# Patient Record
Sex: FEMALE | Race: WHITE | NOT HISPANIC OR LATINO
[De-identification: names, ages, dates, MRNs, and addresses within clinical notes are randomized per-mention and may not be internally consistent; named-entity substitution may affect disease eponyms.]

---

## 2017-06-01 ENCOUNTER — APPOINTMENT (OUTPATIENT)
Dept: MAMMOGRAPHY | Facility: CLINIC | Age: 82
End: 2017-06-01

## 2017-06-01 ENCOUNTER — OUTPATIENT (OUTPATIENT)
Dept: OUTPATIENT SERVICES | Facility: HOSPITAL | Age: 82
LOS: 1 days | End: 2017-06-01
Payer: MEDICARE

## 2017-06-01 DIAGNOSIS — Z00.8 ENCOUNTER FOR OTHER GENERAL EXAMINATION: ICD-10-CM

## 2017-06-01 PROCEDURE — 77063 BREAST TOMOSYNTHESIS BI: CPT

## 2017-06-01 PROCEDURE — 77067 SCR MAMMO BI INCL CAD: CPT

## 2017-06-20 ENCOUNTER — APPOINTMENT (OUTPATIENT)
Dept: ELECTROPHYSIOLOGY | Facility: CLINIC | Age: 82
End: 2017-06-20

## 2017-06-20 VITALS
HEART RATE: 64 BPM | HEIGHT: 59 IN | BODY MASS INDEX: 31.65 KG/M2 | SYSTOLIC BLOOD PRESSURE: 139 MMHG | WEIGHT: 157 LBS | DIASTOLIC BLOOD PRESSURE: 60 MMHG

## 2017-11-22 ENCOUNTER — INPATIENT (INPATIENT)
Facility: HOSPITAL | Age: 82
LOS: 1 days | Discharge: HOME | End: 2017-11-24
Attending: INTERNAL MEDICINE | Admitting: INTERNAL MEDICINE
Payer: MEDICARE

## 2017-11-22 VITALS — WEIGHT: 156.09 LBS | HEIGHT: 58 IN

## 2017-11-22 DIAGNOSIS — I10 ESSENTIAL (PRIMARY) HYPERTENSION: ICD-10-CM

## 2017-11-22 DIAGNOSIS — E03.9 HYPOTHYROIDISM, UNSPECIFIED: ICD-10-CM

## 2017-11-22 DIAGNOSIS — Z00.00 ENCOUNTER FOR GENERAL ADULT MEDICAL EXAMINATION WITHOUT ABNORMAL FINDINGS: ICD-10-CM

## 2017-11-22 DIAGNOSIS — E78.5 HYPERLIPIDEMIA, UNSPECIFIED: ICD-10-CM

## 2017-11-22 DIAGNOSIS — I48.92 UNSPECIFIED ATRIAL FLUTTER: ICD-10-CM

## 2017-11-22 LAB
ALBUMIN SERPL ELPH-MCNC: 3.5 G/DL — SIGNIFICANT CHANGE UP (ref 3.3–5)
ALP SERPL-CCNC: 68 U/L — SIGNIFICANT CHANGE UP (ref 40–120)
ALT FLD-CCNC: 18 U/L — SIGNIFICANT CHANGE UP (ref 12–78)
ANION GAP SERPL CALC-SCNC: 7 MMOL/L — SIGNIFICANT CHANGE UP (ref 5–17)
APTT BLD: 36.3 SEC — SIGNIFICANT CHANGE UP (ref 27.5–37.4)
AST SERPL-CCNC: 13 U/L — LOW (ref 15–37)
BASOPHILS # BLD AUTO: 0.1 K/UL — SIGNIFICANT CHANGE UP (ref 0–0.2)
BASOPHILS NFR BLD AUTO: 0.5 % — SIGNIFICANT CHANGE UP (ref 0–2)
BILIRUB SERPL-MCNC: 0.7 MG/DL — SIGNIFICANT CHANGE UP (ref 0.2–1.2)
BUN SERPL-MCNC: 23 MG/DL — SIGNIFICANT CHANGE UP (ref 7–23)
CALCIUM SERPL-MCNC: 9 MG/DL — SIGNIFICANT CHANGE UP (ref 8.5–10.1)
CHLORIDE SERPL-SCNC: 106 MMOL/L — SIGNIFICANT CHANGE UP (ref 96–108)
CK SERPL-CCNC: 37 U/L — SIGNIFICANT CHANGE UP (ref 26–192)
CO2 SERPL-SCNC: 30 MMOL/L — SIGNIFICANT CHANGE UP (ref 22–31)
CREAT SERPL-MCNC: 1.37 MG/DL — HIGH (ref 0.5–1.3)
EOSINOPHIL # BLD AUTO: 0.1 K/UL — SIGNIFICANT CHANGE UP (ref 0–0.5)
EOSINOPHIL NFR BLD AUTO: 0.5 % — SIGNIFICANT CHANGE UP (ref 0–6)
GLUCOSE SERPL-MCNC: 111 MG/DL — HIGH (ref 70–99)
HCT VFR BLD CALC: 39.8 % — SIGNIFICANT CHANGE UP (ref 34.5–45)
HGB BLD-MCNC: 13.5 G/DL — SIGNIFICANT CHANGE UP (ref 11.5–15.5)
INR BLD: 2.13 RATIO — HIGH (ref 0.88–1.16)
LYMPHOCYTES # BLD AUTO: 1.9 K/UL — SIGNIFICANT CHANGE UP (ref 1–3.3)
LYMPHOCYTES # BLD AUTO: 17.6 % — SIGNIFICANT CHANGE UP (ref 13–44)
MCHC RBC-ENTMCNC: 32.1 PG — SIGNIFICANT CHANGE UP (ref 27–34)
MCHC RBC-ENTMCNC: 34 GM/DL — SIGNIFICANT CHANGE UP (ref 32–36)
MCV RBC AUTO: 94.5 FL — SIGNIFICANT CHANGE UP (ref 80–100)
MONOCYTES # BLD AUTO: 0.6 K/UL — SIGNIFICANT CHANGE UP (ref 0–0.9)
MONOCYTES NFR BLD AUTO: 5.3 % — SIGNIFICANT CHANGE UP (ref 2–14)
NEUTROPHILS # BLD AUTO: 8.1 K/UL — HIGH (ref 1.8–7.4)
NEUTROPHILS NFR BLD AUTO: 76.1 % — SIGNIFICANT CHANGE UP (ref 43–77)
NT-PROBNP SERPL-SCNC: 5444 PG/ML — HIGH (ref 0–450)
PLATELET # BLD AUTO: 183 K/UL — SIGNIFICANT CHANGE UP (ref 150–400)
POTASSIUM SERPL-MCNC: 3.9 MMOL/L — SIGNIFICANT CHANGE UP (ref 3.5–5.3)
POTASSIUM SERPL-SCNC: 3.9 MMOL/L — SIGNIFICANT CHANGE UP (ref 3.5–5.3)
PROT SERPL-MCNC: 7.2 GM/DL — SIGNIFICANT CHANGE UP (ref 6–8.3)
PROTHROM AB SERPL-ACNC: 23.4 SEC — HIGH (ref 9.8–12.7)
RBC # BLD: 4.21 M/UL — SIGNIFICANT CHANGE UP (ref 3.8–5.2)
RBC # FLD: 15 % — HIGH (ref 10.3–14.5)
SODIUM SERPL-SCNC: 143 MMOL/L — SIGNIFICANT CHANGE UP (ref 135–145)
TROPONIN I SERPL-MCNC: <0.015 NG/ML — SIGNIFICANT CHANGE UP (ref 0.01–0.04)
WBC # BLD: 10.7 K/UL — HIGH (ref 3.8–10.5)
WBC # FLD AUTO: 10.7 K/UL — HIGH (ref 3.8–10.5)

## 2017-11-22 PROCEDURE — 73080 X-RAY EXAM OF ELBOW: CPT | Mod: 26,RT

## 2017-11-22 PROCEDURE — 93010 ELECTROCARDIOGRAM REPORT: CPT

## 2017-11-22 PROCEDURE — 71010: CPT | Mod: 26

## 2017-11-22 PROCEDURE — 99285 EMERGENCY DEPT VISIT HI MDM: CPT

## 2017-11-22 PROCEDURE — 70450 CT HEAD/BRAIN W/O DYE: CPT | Mod: 26

## 2017-11-22 RX ORDER — APIXABAN 2.5 MG/1
0 TABLET, FILM COATED ORAL
Qty: 0 | Refills: 0 | COMMUNITY

## 2017-11-22 RX ORDER — METOPROLOL TARTRATE 50 MG
100 TABLET ORAL AT BEDTIME
Qty: 0 | Refills: 0 | Status: DISCONTINUED | OUTPATIENT
Start: 2017-11-22 | End: 2017-11-22

## 2017-11-22 RX ORDER — ASCORBIC ACID 60 MG
500 TABLET,CHEWABLE ORAL DAILY
Qty: 0 | Refills: 0 | Status: DISCONTINUED | OUTPATIENT
Start: 2017-11-22 | End: 2017-11-24

## 2017-11-22 RX ORDER — DILTIAZEM HCL 120 MG
0 CAPSULE, EXT RELEASE 24 HR ORAL
Qty: 0 | Refills: 0 | COMMUNITY

## 2017-11-22 RX ORDER — CALCIUM CARBONATE 500(1250)
1 TABLET ORAL THREE TIMES A DAY
Qty: 0 | Refills: 0 | Status: DISCONTINUED | OUTPATIENT
Start: 2017-11-22 | End: 2017-11-24

## 2017-11-22 RX ORDER — SODIUM CHLORIDE 9 MG/ML
3 INJECTION INTRAMUSCULAR; INTRAVENOUS; SUBCUTANEOUS ONCE
Qty: 0 | Refills: 0 | Status: COMPLETED | OUTPATIENT
Start: 2017-11-22 | End: 2017-11-22

## 2017-11-22 RX ORDER — FUROSEMIDE 40 MG
40 TABLET ORAL
Qty: 0 | Refills: 0 | Status: DISCONTINUED | OUTPATIENT
Start: 2017-11-22 | End: 2017-11-24

## 2017-11-22 RX ORDER — METOPROLOL TARTRATE 50 MG
100 TABLET ORAL AT BEDTIME
Qty: 0 | Refills: 0 | Status: DISCONTINUED | OUTPATIENT
Start: 2017-11-22 | End: 2017-11-24

## 2017-11-22 RX ORDER — FENOFIBRATE,MICRONIZED 130 MG
48 CAPSULE ORAL DAILY
Qty: 0 | Refills: 0 | Status: DISCONTINUED | OUTPATIENT
Start: 2017-11-22 | End: 2017-11-24

## 2017-11-22 RX ORDER — LEVOTHYROXINE SODIUM 125 MCG
0 TABLET ORAL
Qty: 0 | Refills: 0 | COMMUNITY

## 2017-11-22 RX ORDER — APIXABAN 2.5 MG/1
5 TABLET, FILM COATED ORAL EVERY 12 HOURS
Qty: 0 | Refills: 0 | Status: DISCONTINUED | OUTPATIENT
Start: 2017-11-22 | End: 2017-11-24

## 2017-11-22 RX ORDER — FENOFIBRATE,MICRONIZED 130 MG
0 CAPSULE ORAL
Qty: 0 | Refills: 0 | COMMUNITY

## 2017-11-22 RX ORDER — ACETAMINOPHEN 500 MG
650 TABLET ORAL EVERY 6 HOURS
Qty: 0 | Refills: 0 | Status: DISCONTINUED | OUTPATIENT
Start: 2017-11-22 | End: 2017-11-24

## 2017-11-22 RX ORDER — LEVOTHYROXINE SODIUM 125 MCG
88 TABLET ORAL DAILY
Qty: 0 | Refills: 0 | Status: DISCONTINUED | OUTPATIENT
Start: 2017-11-22 | End: 2017-11-24

## 2017-11-22 RX ORDER — CHOLECALCIFEROL (VITAMIN D3) 125 MCG
2000 CAPSULE ORAL DAILY
Qty: 0 | Refills: 0 | Status: DISCONTINUED | OUTPATIENT
Start: 2017-11-22 | End: 2017-11-24

## 2017-11-22 RX ORDER — MULTIVIT-MIN/FERROUS GLUCONATE 9 MG/15 ML
0 LIQUID (ML) ORAL
Qty: 0 | Refills: 0 | COMMUNITY

## 2017-11-22 RX ORDER — OMEGA-3 ACID ETHYL ESTERS 1 G
2 CAPSULE ORAL DAILY
Qty: 0 | Refills: 0 | Status: DISCONTINUED | OUTPATIENT
Start: 2017-11-22 | End: 2017-11-24

## 2017-11-22 RX ORDER — POTASSIUM CHLORIDE 20 MEQ
0 PACKET (EA) ORAL
Qty: 0 | Refills: 0 | COMMUNITY

## 2017-11-22 RX ORDER — ONDANSETRON 8 MG/1
4 TABLET, FILM COATED ORAL EVERY 4 HOURS
Qty: 0 | Refills: 0 | Status: DISCONTINUED | OUTPATIENT
Start: 2017-11-22 | End: 2017-11-24

## 2017-11-22 RX ORDER — IBANDRONATE SODIUM 150 MG/1
0 TABLET ORAL
Qty: 0 | Refills: 0 | COMMUNITY

## 2017-11-22 RX ORDER — METOPROLOL TARTRATE 50 MG
50 TABLET ORAL DAILY
Qty: 0 | Refills: 0 | Status: DISCONTINUED | OUTPATIENT
Start: 2017-11-22 | End: 2017-11-24

## 2017-11-22 RX ORDER — FUROSEMIDE 40 MG
0 TABLET ORAL
Qty: 0 | Refills: 0 | COMMUNITY

## 2017-11-22 RX ADMIN — Medication 100 MILLIGRAM(S): at 22:00

## 2017-11-22 RX ADMIN — SODIUM CHLORIDE 3 MILLILITER(S): 9 INJECTION INTRAMUSCULAR; INTRAVENOUS; SUBCUTANEOUS at 11:37

## 2017-11-22 RX ADMIN — APIXABAN 5 MILLIGRAM(S): 2.5 TABLET, FILM COATED ORAL at 22:00

## 2017-11-22 RX ADMIN — Medication 48 MILLIGRAM(S): at 22:01

## 2017-11-22 RX ADMIN — Medication 40 MILLIGRAM(S): at 18:17

## 2017-11-22 NOTE — CONSULT NOTE ADULT - ASSESSMENT
A/P: 84y Female with R nonseptic olecranon bursitis    Pain control  NSAIDs if no medical contraindication  low wbc and afebrile, currently not septic bursitis abx not necessary   wbat RUE  ACE wrap for swelling control  Ice  FU esr/crp  Active movement of fingers/wrist/elbow encouraged  no acute surgical intervention at this time  conservative mgmt  imaging reviewed  All question answered  Follow up with Dr. Foreman as outpatient within 1-2 weeks, call office for appointment   attending aware and agrees with above plan  Ortho stable   will monitor clinically for improvement A/P: 84y Female with R nonseptic olecranon bursitis    Pain control, NSAIDs if no medical contraindication  due to clinical presentation very low likely harp of septic arthritis  low wbc and afebrile, currently not septic bursitis abx not necessary  wbat RUE  ACE wrap for swelling control  Ice  FU esr/crp  Active movement of fingers/wrist/elbow encouraged  no acute surgical intervention at this time  conservative mgmt  imaging reviewed  All question answered  Follow up with Dr. Foreman as outpatient within 1-2 weeks, call office for appointment   attending aware and agrees with above plan  Ortho stable   will monitor clinically for improvement

## 2017-11-22 NOTE — ED STATDOCS - NS_ ATTENDINGSCRIBEDETAILS _ED_A_ED_FT
Ronnie Lebron DO (Attending): The history, relevant review of systems, past medical and surgical history, medical decision making, and physical examination was documented by the scribe in my presence and I attest to the accuracy of the documentation.

## 2017-11-22 NOTE — ED STATDOCS - PROGRESS NOTE DETAILS
85 y/o male reporting weakness and palpitations. EKG with A flutter with rapid response. Will send pt to the main ED for further evaluation. I, Ronnie Lebron DO, performed the initial face to face bedside interview with this patient regarding history of present illness and determined that the patient should be seen in the main ED.  The history, was documented by the scribe in my presence and I attest to the accuracy of the documentation.

## 2017-11-22 NOTE — H&P ADULT - PROBLEM SELECTOR PLAN 2
History of chronic atrial fibrillation Rate improved  Resume home med metoprolol BID; Also takes diltiazem ER  AC with eliquis

## 2017-11-22 NOTE — ED ADULT NURSE REASSESSMENT NOTE - NS ED NURSE REASSESS COMMENT FT1
Patient care received from CLOVER Koenig. Patient A&Ox4, resting comfortably in bed. VSS, denies pain/discomfort at this time. Denies palpitations, denies SOB. Generalized weakness. Mild swelling to R elbow with increased warmth, will notify MD. Safety & comfort measures in place, spouse at bedside. Will continue to monitor. Patient care received from CLOVER Koenig. Patient A&Ox4, resting comfortably in bed. VSS, denies pain/discomfort at this time. Denies palpitations, denies SOB. EVANS, generalized weakness. Mild swelling to R elbow with increased warmth, will notify MD. Safety & comfort measures in place, spouse at bedside. Will continue to monitor.

## 2017-11-22 NOTE — PROVIDER CONTACT NOTE (OTHER) - SITUATION
notified service; spoke to Kristen notified service; spoke to Kristen  notified orthopedic resident at [id number]; spoke to Goyo

## 2017-11-22 NOTE — ED PROVIDER NOTE - MEDICAL DECISION MAKING DETAILS
83 y/o with hx of Afib on Eliquis, HTN, presenting with weakness, chest pain, and palpitations.  Pt noted to be in A flutter with RVR on EKG.  Will do ACS workup, Diltiazem for rate.

## 2017-11-22 NOTE — CONSULT NOTE ADULT - SUBJECTIVE AND OBJECTIVE BOX
84y Female RHD presents c/o R elbow pain for over 2 weeks. no hx of trauma, pt states they may have had a bug bite over elbow but unsure. elbow hurts with movement at max flexion but no pain with extension. Denies numbness/tingling. Denies fever/chills. Denies pain/injury elsewhere. No other complaints. pt admitted for heart palpitations.     ROS: denies chest pain/sob, all other ROS negative other than noted above    HEALTH ISSUES - PROBLEM Dx:  Preventative health care: Preventative health care  Hypothyroidism, unspecified type: Hypothyroidism, unspecified type  Hypertension, unspecified type: Hypertension, unspecified type  Hyperlipidemia, unspecified hyperlipidemia type: Hyperlipidemia, unspecified hyperlipidemia type  Atrial flutter, unspecified type: Atrial flutter, unspecified type    MEDICATIONS  (STANDING):  apixaban 5 milliGRAM(s) Oral every 12 hours  ascorbic acid 500 milliGRAM(s) Oral daily  calcium carbonate 500 mG (Tums) Chewable 1 Tablet(s) Chew three times a day  cholecalciferol 2000 Unit(s) Oral daily  fenofibrate Tablet 48 milliGRAM(s) Oral daily  furosemide    Tablet 40 milliGRAM(s) Oral two times a day  levothyroxine 88 MICROGram(s) Oral daily  metoprolol succinate  milliGRAM(s) Oral at bedtime  metoprolol succinate ER 50 milliGRAM(s) Oral daily  omega-3-Acid Ethyl Esters 2 Gram(s) Oral daily    Allergies    No Known Allergies    Intolerances                       13.5   10.7  )-----------( 183      ( 22 Nov 2017 10:40 )             39.8     22 Nov 2017 11:42    143    |  106    |  23     ----------------------------<  111    3.9     |  30     |  1.37     Ca    9.0        22 Nov 2017 11:42    TPro  7.2    /  Alb  3.5    /  TBili  0.7    /  DBili  x      /  AST  13     /  ALT  18     /  AlkPhos  68     22 Nov 2017 11:42    PT/INR - ( 22 Nov 2017 11:42 )   PT: 23.4 sec;   INR: 2.13 ratio         PTT - ( 22 Nov 2017 11:42 )  PTT:36.3 sec    esr/crp pending    Vital Signs Last 24 Hrs  T(C): 37.5 (11-22-17 @ 20:25), Max: 37.5 (11-22-17 @ 20:25)  T(F): 99.5 (11-22-17 @ 20:25), Max: 99.5 (11-22-17 @ 20:25)  HR: 95 (11-22-17 @ 20:25) (80 - 137)  BP: 120/53 (11-22-17 @ 20:25) (108/71 - 147/90)  BP(mean): 92 (11-22-17 @ 18:02) (92 - 92)  RR: 16 (11-22-17 @ 20:25) (16 - 21)  SpO2: 94% (11-22-17 @ 20:25) (94% - 98%)  Imaging: XR right elbow demonstrates no fx, calcification w/in tricep, no acute fx, awaiting official read    Physical Exam  Gen: NAD  R UE: Skin intact, mild erythema only over olecranon, no tracking, mild warmth over erythema, no palpable fluctuance, no pain with micromotion of elbow, ROM only pain with end flexion, +ttp over olecranon, +r/u/m/ain/pin function, SILT c5-t1, radial pulse intact, compartments soft/compressible, warm/well perfused

## 2017-11-22 NOTE — PATIENT PROFILE ADULT. - EXTENSIONS OF SELF_ADULT
.Patient presented to the ED today for complaints of left sided chest pain. Respirations are even and unlabored. Skin is dry,warm, and intact. Dentures

## 2017-11-22 NOTE — H&P ADULT - HISTORY OF PRESENT ILLNESS
83 y/o female with a PMHx notable for HTN, HLD, chronic Atrial fibrillation on Eliquis, s/p pacemaker who presents to the ED with reports of palpitations.  Pt notes onset of palpitations x 3 weeks.  She also reports intermittent substernal CP, along with generalized fatigue more so than usual.  She spoke with PMD who told her to visit ED if she does not feel well, so pt decided to visit ED.  Pt otherwise denies fevers, chills, cough.  Also no dyspnea, N/V/D, syncope, or lightheadedness.    Denies sick contacts and no recent travel outside [place].   PCP Dr. Walter Schumacher   Cardiologist Dr. Akins.      PCP Dr. Walter Schumacher. 85 y/o female with a PMHx notable for HTN, HLD, chronic Atrial fibrillation on Eliquis, s/p pacemaker , and hypothyroidism, who presents to the ED with reports of palpitations.  Pt notes onset of palpitations x 3 weeks.  She also reports intermittent substernal CP, along with generalized fatigue more so than usual.  She spoke with PMD who told her to visit ED if she does not feel well, so pt decided to visit ED.  Pt otherwise denies fevers, chills, cough.  Also no dyspnea, N/V/D, syncope, or lightheadedness.    Denies sick contacts and no recent travel outside [place].   PCP Dr. Walter Schumacher   Cardiologist Dr. Akins.      ED course:  EKG atrial flutter with variable AV block Rate 120s  Troponin negative  Pt was given diltiazem 10mg x 1 with improvement in HR

## 2017-11-22 NOTE — H&P ADULT - NSHPPHYSICALEXAM_GEN_ALL_CORE
Physical Exam:   GENERAL APPEARANCE:  NAD, hemodynamically stable  T(C): 36.9 (11-22-17 @ 14:59), Max: 37.2 (11-22-17 @ 10:43)  HR: 80 (11-22-17 @ 14:59) (80 - 137)  BP: 108/71 (11-22-17 @ 14:59) (108/71 - 132/84)  RR: 20 (11-22-17 @ 14:59) (19 - 21)  SpO2: 96% (11-22-17 @ 14:59) (96% - 96%)

## 2017-11-22 NOTE — H&P ADULT - PROBLEM SELECTOR PLAN 1
Rate improved s/p IV diltiazem in ED   Resume home med metoprolol.  Also on diltiazem ER  Already on AC with eliquis  Rule out ACS  Send for 2D ECHO, TSH, fasting lipid panel   consult cardiology

## 2017-11-22 NOTE — H&P ADULT - ASSESSMENT
83 y/o female with a PMHx notable for HTN, HLD, chronic Atrial fibrillation on Eliquis, s/p pacemaker, and hypothyrodism.  She presents to the ED with reports of palpitations.  Pt notes onset of palpitations x 3 weeks.  She also reports intermittent substernal CP, along with generalized fatigue more so than usual.  She spoke with PMD who told her to visit ED if she does not feel well, so pt decided to visit ED.  Pt otherwise denies fevers, chills, cough.  Also no dyspnea, N/V/D, syncope, or lightheadedness.    Denies sick contacts and no recent travel outside [place].   PCP Dr. Walter Schumacher   Cardiologist Dr. Akins.      ED course:  EKG atrial flutter with variable AV block Rate 120s  Troponin negative  Pt was given diltiazem 10mg x 1 with improvement in HR

## 2017-11-22 NOTE — ED PROVIDER NOTE - OBJECTIVE STATEMENT
83 y/o male presents to the ED reporting weakness and palpitations.  EKG with A flutter with rapid response. 85 y/o male presents to the ED reporting weakness and palpitations.  Pt without pain in ED.  EKG with A flutter with rapid response. 83 y/o female with PMHx of HTN, HLD, Afib (on Eliquis), pacemaker presents to the ED reporting weakness and palpitations. Pt has had palpitations for 3 weeks, which she typically does not notice but occasionally will feel chest pressure.  Pt currently without pain.  She spoke with PMD who told her to visit ED if she does not feel well, so pt decided to visit ED as she is feeling more tired than usual.  Denies SOB, cough, fever, syncope, lightheadedness.  She also notes she has had some peripheral edema.  Does not use supplemental O2 at home.  Cardiologist Dr. Akins.  PCP Dr. Walter Schumacher.

## 2017-11-22 NOTE — ED PROVIDER NOTE - PROGRESS NOTE DETAILS
Ronnie Lebron DO (Attending): Patient with palpitations, weakness, EKG w/ a-flutter, rapid response, will send to Main ED.
AJM: trop neg. admitted. 10 mg dilt improved hr

## 2017-11-22 NOTE — ED ADULT NURSE REASSESSMENT NOTE - NS ED NURSE REASSESS COMMENT FT1
Patient remains as previously assessed. VSS, denies palpitations, denies SOB. Denies pain/discomfort, resting comfortably in bed. EVANS, generalized weakness. Awaiting transport to , hand-off report to CLOVER Lugo completed. Safety & comfort measures in place. Patient remains as previously assessed. VSS, denies palpitations, denies SOB. Denies pain/discomfort, resting comfortably in bed. EVANS, generalized weakness. Lungs diminished to auscultation. Awaiting transport to , hand-off report to CLOVER Lugo completed. Safety & comfort measures in place.

## 2017-11-22 NOTE — H&P ADULT - NSHPLABSRESULTS_GEN_ALL_CORE
13.5   10.7  )-----------( 183      ( 22 Nov 2017 10:40 )             39.8     11-    143  |  106  |  23  ----------------------------<  111<H>  3.9   |  30  |  1.37<H>    Calcium    9.0      22 Nov 2017 11:42    Total Protein  7.2  /  Albumin  3.5  /  Total Bilirubin  0.7  /  Direct Bili  x   /  AST  13<L>  /  ALT  18  /  AlkPhos  68  11-22    CARDIAC MARKERS ( 22 Nov 2017 13:51 )  <0.015 ng/mL / x     / x     / x     / x      CARDIAC MARKERS ( 22 Nov 2017 11:42 )  <0.015 ng/mL / x     / 37 U/L / x     / x      PT/INR - ( 22 Nov 2017 11:42 )   PT: 23.4 sec;   INR: 2.13 ratio    PTT - ( 22 Nov 2017 11:42 )  PTT:36.3 sec

## 2017-11-23 LAB
ANION GAP SERPL CALC-SCNC: 6 MMOL/L — SIGNIFICANT CHANGE UP (ref 5–17)
APTT BLD: 36.5 SEC — SIGNIFICANT CHANGE UP (ref 27.5–37.4)
B BURGDOR C6 AB SER-ACNC: NEGATIVE — SIGNIFICANT CHANGE UP
B BURGDOR IGG+IGM SER-ACNC: 0.01 INDEX — SIGNIFICANT CHANGE UP (ref 0.01–0.89)
BUN SERPL-MCNC: 25 MG/DL — HIGH (ref 7–23)
CALCIUM SERPL-MCNC: 8.3 MG/DL — LOW (ref 8.5–10.1)
CHLORIDE SERPL-SCNC: 104 MMOL/L — SIGNIFICANT CHANGE UP (ref 96–108)
CHOLEST SERPL-MCNC: 91 MG/DL — SIGNIFICANT CHANGE UP (ref 10–199)
CO2 SERPL-SCNC: 34 MMOL/L — HIGH (ref 22–31)
CREAT SERPL-MCNC: 1.36 MG/DL — HIGH (ref 0.5–1.3)
CRP SERPL-MCNC: 4.1 MG/DL — HIGH (ref 0–0.4)
ERYTHROCYTE [SEDIMENTATION RATE] IN BLOOD: 1 MM/HR — SIGNIFICANT CHANGE UP (ref 0–20)
GLUCOSE SERPL-MCNC: 117 MG/DL — HIGH (ref 70–99)
HDLC SERPL-MCNC: 43 MG/DL — SIGNIFICANT CHANGE UP (ref 40–125)
INR BLD: 2.24 RATIO — HIGH (ref 0.88–1.16)
LIPID PNL WITH DIRECT LDL SERPL: 33 MG/DL — SIGNIFICANT CHANGE UP
POTASSIUM SERPL-MCNC: 4.1 MMOL/L — SIGNIFICANT CHANGE UP (ref 3.5–5.3)
POTASSIUM SERPL-SCNC: 4.1 MMOL/L — SIGNIFICANT CHANGE UP (ref 3.5–5.3)
PROTHROM AB SERPL-ACNC: 24.6 SEC — HIGH (ref 9.8–12.7)
SODIUM SERPL-SCNC: 144 MMOL/L — SIGNIFICANT CHANGE UP (ref 135–145)
TOTAL CHOLESTEROL/HDL RATIO MEASUREMENT: 2.1 RATIO — LOW (ref 3.3–7.1)
TRIGL SERPL-MCNC: 76 MG/DL — SIGNIFICANT CHANGE UP (ref 10–149)
TSH SERPL-MCNC: 1.13 UU/ML — SIGNIFICANT CHANGE UP (ref 0.36–3.74)

## 2017-11-23 RX ADMIN — Medication 2000 UNIT(S): at 11:22

## 2017-11-23 RX ADMIN — Medication 1 TABLET(S): at 13:15

## 2017-11-23 RX ADMIN — Medication 1 TABLET(S): at 06:21

## 2017-11-23 RX ADMIN — Medication 100 MILLIGRAM(S): at 21:43

## 2017-11-23 RX ADMIN — APIXABAN 5 MILLIGRAM(S): 2.5 TABLET, FILM COATED ORAL at 06:21

## 2017-11-23 RX ADMIN — Medication 2 GRAM(S): at 11:22

## 2017-11-23 RX ADMIN — Medication 40 MILLIGRAM(S): at 06:21

## 2017-11-23 RX ADMIN — Medication 50 MILLIGRAM(S): at 06:22

## 2017-11-23 RX ADMIN — Medication 48 MILLIGRAM(S): at 11:22

## 2017-11-23 RX ADMIN — Medication 40 MILLIGRAM(S): at 18:02

## 2017-11-23 RX ADMIN — Medication 500 MILLIGRAM(S): at 11:22

## 2017-11-23 RX ADMIN — Medication 1 TABLET(S): at 21:43

## 2017-11-23 RX ADMIN — Medication 88 MICROGRAM(S): at 06:22

## 2017-11-23 RX ADMIN — APIXABAN 5 MILLIGRAM(S): 2.5 TABLET, FILM COATED ORAL at 18:02

## 2017-11-23 NOTE — PROGRESS NOTE ADULT - SUBJECTIVE AND OBJECTIVE BOX
c/c: palpitations.    HPI:  85 y/o female with a PMHx notable for HTN, HLD, chronic Atrial fibrillation on Eliquis, s/p pacemaker , and hypothyroidism, who presents to the ED with reports of palpitations.  Pt notes onset of palpitations x 3 weeks.  She also reports intermittent substernal CP, along with generalized fatigue more so than usual.  She had seen cardiology as outpt and recommended to go to er if she felt worse. She came to the ER as her symptoms did not improve. In ED found to be in rapid atrial flutter. Given iv cardizem 10mg with good response and admitted. Hospital course notable for acute olecranon bursitis.    11/23: pt seen and examined. DIdn't sleep well. Feeling a little better. Did not ambulate yet. No sob. No cp at present.    Review of system- All 10 systems reviewed and is as per HPI otherwise negative.     T(C): 36.4 (11-23-17 @ 05:20), Max: 37.5 (11-22-17 @ 20:25)  HR: 97 (11-23-17 @ 05:20) (80 - 125)  BP: 105/65 (11-23-17 @ 05:20) (105/65 - 147/90)  RR: 17 (11-23-17 @ 05:20) (16 - 21)  SpO2: 97% (11-23-17 @ 05:20) (94% - 98%)      PHYSICAL EXAM:    GENERAL: Comfortable, no acute distress  HEAD:  Atraumatic, Normocephalic  EYES: EOMI, PERRLA  HEENT: Moist mucous membranes  NECK: Supple, No JVD  NERVOUS SYSTEM:  Alert & Oriented X3, Motor Strength 5/5 B/L upper and lower extremities  CHEST/LUNG: Clear to auscultation bilaterally  HEART: Regular rate and rhythm;   ABDOMEN: Soft, Nontender, Nondistended; Bowel sounds present  GENITOURINARY- Voiding, no palpable bladder  EXTREMITIES:  No clubbing, cyanosis. +LE edema b/l  MUSCULOSKELETAL- right elbow erythema, FROM  SKIN-no rash        LABS:                        13.5   10.7  )-----------( 183      ( 22 Nov 2017 10:40 )             39.8     11-23    144  |  104  |  25<H>  ----------------------------<  117<H>  4.1   |  34<H>  |  1.36<H>    Ca    8.3<L>      23 Nov 2017 06:33    TPro  7.2  /  Alb  3.5  /  TBili  0.7  /  DBili  x   /  AST  13<L>  /  ALT  18  /  AlkPhos  68  11-22    PT/INR - ( 23 Nov 2017 06:33 )   PT: 24.6 sec;   INR: 2.24 ratio         PTT - ( 23 Nov 2017 06:33 )  PTT:36.5 sec        CARDIAC MARKERS ( 22 Nov 2017 19:30 )  <0.015 ng/mL / x     / x     / x     / x      CARDIAC MARKERS ( 22 Nov 2017 13:51 )  <0.015 ng/mL / x     / x     / x     / x      CARDIAC MARKERS ( 22 Nov 2017 11:42 )  <0.015 ng/mL / x     / 37 U/L / x     / x            MEDS  acetaminophen   Tablet 650 milliGRAM(s) Oral every 6 hours PRN  apixaban 5 milliGRAM(s) Oral every 12 hours  ascorbic acid 500 milliGRAM(s) Oral daily  calcium carbonate 500 mG (Tums) Chewable 1 Tablet(s) Chew three times a day  cholecalciferol 2000 Unit(s) Oral daily  fenofibrate Tablet 48 milliGRAM(s) Oral daily  furosemide    Tablet 40 milliGRAM(s) Oral two times a day  levothyroxine 88 MICROGram(s) Oral daily  metoprolol succinate  milliGRAM(s) Oral at bedtime  metoprolol succinate ER 50 milliGRAM(s) Oral daily  omega-3-Acid Ethyl Esters 2 Gram(s) Oral daily  ondansetron Injectable 4 milliGRAM(s) IV Push every 4 hours PRN    ASSESSMENT AND PLAN:     84F, pmh as above a/w:    1. Atrial flutter With rapid ventricular rate  -s/p IV cardizem in ED  -HR up to 130s yesterday, better today  -continue home dose of metoprolol for now  -f/u cardio recommendations  -f/u echo  -on eliquis    2. B/L LE edema:  -?h/o diasolic CHF  -f/u echo  -on lasix.    3. Acute olecranon bursitis:  -ortho eval appreciated  -pain control  -rest  -ice  -outpt f/u with dr. gabriel    4. HLD:  -statin/tricor    5. HTN:  -continue bb/ccb    6. Hypothyroidism:  -continue synthroid  -tsh wnl    7. DVT px:  -on eliquis

## 2017-11-23 NOTE — CONSULT NOTE ADULT - SUBJECTIVE AND OBJECTIVE BOX
Patient is a 84y old  Female who presents with a chief complaint of palpitations (22 Nov 2017 16:40)      HPI:  83 y/o female with a PMHx notable for HTN, HLD, chronic Atrial fibrillation on Eliquis, s/p pacemaker , and hypothyroidism, who presents to the ED with reports of palpitations.  Pt notes onset of palpitations x 3 weeks.  She also reports intermittent substernal CP, along with generalized fatigue more so than usual.  She spoke with PMD who told her to visit ED if she does not feel well, so pt decided to visit ED.  Pt otherwise denies fevers, chills, cough.  Also no dyspnea, N/V/D, syncope, or lightheadedness.    Denies sick contacts and no recent travel outside [place].   PCP Dr. Walter Schumacher   Cardiologist Dr. Akins.      ED course:  EKG atrial flutter with variable AV block Rate 120s  Troponin negative  Pt was given diltiazem 10mg x 1 with improvement in HR (22 Nov 2017 16:40)      PAST MEDICAL & SURGICAL HISTORY:  Pacemaker  Atrial fibrillation  HLD (hyperlipidemia)  HTN (hypertension)  No significant past surgical history      PREVIOUS CARDIAC WORKUP:      Echo: 7/17. Nml EF, 2+ MR  Cardiac Cath: 5/12. normal cors    ALLERGIES:    No Known Allergies       MEDICATIONS  (STANDING):  apixaban 5 milliGRAM(s) Oral every 12 hours  ascorbic acid 500 milliGRAM(s) Oral daily  calcium carbonate 500 mG (Tums) Chewable 1 Tablet(s) Chew three times a day  cholecalciferol 2000 Unit(s) Oral daily  fenofibrate Tablet 48 milliGRAM(s) Oral daily  furosemide    Tablet 40 milliGRAM(s) Oral two times a day  levothyroxine 88 MICROGram(s) Oral daily  metoprolol succinate  milliGRAM(s) Oral at bedtime  metoprolol succinate ER 50 milliGRAM(s) Oral daily  omega-3-Acid Ethyl Esters 2 Gram(s) Oral daily    MEDICATIONS  (PRN):  acetaminophen   Tablet 650 milliGRAM(s) Oral every 6 hours PRN For Temp greater than 38 C (100.4 F)  ondansetron Injectable 4 milliGRAM(s) IV Push every 4 hours PRN Nausea      FAMILY HISTORY:  No pertinent family history in first degree relatives        SOCIAL HISTORY:  .scl     ROS:     .ros2    Vital Signs Last 24 Hrs  T(C): 36.2 (23 Nov 2017 11:29), Max: 37.5 (22 Nov 2017 20:25)  T(F): 97.2 (23 Nov 2017 11:29), Max: 99.5 (22 Nov 2017 20:25)  HR: 75 (23 Nov 2017 11:29) (75 - 105)  BP: 121/86 (23 Nov 2017 11:29) (105/65 - 147/90)  BP(mean): 92 (22 Nov 2017 18:02) (92 - 92)  RR: 16 (23 Nov 2017 11:29) (16 - 20)  SpO2: 96% (23 Nov 2017 11:29) (94% - 98%)    I&O's Summary      PHYSICAL EXAM:    General Appearance:    Comfortable, AAO X 3, in no distress.   HEENT:                       Atraumatic, PERRLA, EOMI, conjunctiva clear.   Neck:                          Supple, no adenopathy, no thyromegaly, no JVD, no carotid bruit  Back:                          Symmetric, no CV angle fullness or tenderness, no soft tissue tenderness.  Chest:                         Clear to auscultation, no wheezes, rales or rhonchi, symmetric air entry, no tachypnea, retractions or cyanosis  Heart:                          S1, S2 normal, no gallop, no murmur.  Abdomen:                    Soft, non-tender, bowel sounds active. No palpable masses.   Extremities:                 no cyanosis, no edema, no joint swelling. Peripheral pulses normal  Skin:                           Skin color, texture normal. No rashes   Neurologic:                  Grossly cranial nerves intact, sensory and motor normal.      TELEMETRY:    ECG:    LABS:                          13.5   10.7  )-----------( 183      ( 22 Nov 2017 10:40 )             39.8     11-23    144  |  104  |  25<H>  ----------------------------<  117<H>  4.1   |  34<H>  |  1.36<H>    Ca    8.3<L>      23 Nov 2017 06:33    TPro  7.2  /  Alb  3.5  /  TBili  0.7  /  DBili  x   /  AST  13<L>  /  ALT  18  /  AlkPhos  68  11-22    Lipid Panel  Chl 91  HDL 43  LDL 33  Trg 76      11-22 @ 19:30  Trop-I  <0.015  CK      --  CK-MB   --    11-22 @ 13:51  Trop-I  <0.015  CK      --  CK-MB   --    11-22 @ 11:42  Trop-I  <0.015  CK      37  CK-MB   --    Pro BNP  [id number] 11-22 @ 11:42  D Dimer  -- 11-22 @ 11:42    PT/INR - ( 23 Nov 2017 06:33 )   PT: 24.6 sec;   INR: 2.24 ratio    PTT - ( 23 Nov 2017 06:33 )  PTT:36.5 sec    RADIOLOGY & ADDITIONAL STUDIES:    Xray Chest 1 View AP/PA. (11.22.17 @ 10:59) >  IMPRESSION: Cardiomegaly with clear lungs Chief complaint of palpitations (22 Nov 2017 16:40)      HPI:  85 y/o female with a PMHx notable for HTN, HLD, chronic Atrial fibrillation on Eliquis, s/p pacemaker , and hypothyroidism, who presents to the ED with reports of palpitations.  Pt notes onset of palpitations x 3 weeks.  She also reports intermittent substernal CP, along with generalized fatigue more so than usual.  Recently evaluated at our office and was found to be tachycardic. She was started on Cardizem. She also has recently gained about 4-5 pounds of weight. No improvement with medications so she came to the ER for further evaluation. Denies fevers, chills, cough.  Also no dyspnea, N/V/D, syncope, or lightheadedness.        PAST MEDICAL & SURGICAL HISTORY:  Pacemaker  Atrial fibrillation  HLD (hyperlipidemia)  HTN (hypertension)  No significant past surgical history      PREVIOUS CARDIAC WORKUP:      Echo: 7/17. Nml EF, 2+ MR  Cardiac Cath: 5/12. normal cors    ALLERGIES:    No Known Allergies       MEDICATIONS  (STANDING):  apixaban 5 milliGRAM(s) Oral every 12 hours  ascorbic acid 500 milliGRAM(s) Oral daily  calcium carbonate 500 mG (Tums) Chewable 1 Tablet(s) Chew three times a day  cholecalciferol 2000 Unit(s) Oral daily  fenofibrate Tablet 48 milliGRAM(s) Oral daily  furosemide    Tablet 40 milliGRAM(s) Oral two times a day  levothyroxine 88 MICROGram(s) Oral daily  metoprolol succinate  milliGRAM(s) Oral at bedtime  metoprolol succinate ER 50 milliGRAM(s) Oral daily  omega-3-Acid Ethyl Esters 2 Gram(s) Oral daily    MEDICATIONS  (PRN):  acetaminophen   Tablet 650 milliGRAM(s) Oral every 6 hours PRN For Temp greater than 38 C (100.4 F)  ondansetron Injectable 4 milliGRAM(s) IV Push every 4 hours PRN Nausea      FAMILY HISTORY:  No pertinent family history in first degree relatives        SOCIAL HISTORY:  Nonsmoker. No ETOH abuse. No illicit drugs.     ROS:     Detailed ten system ROS was performed and was negative except for history as eluded to above.    no fever  no chills  no nausea  no vomiting  no diarrhea  no constipation  no melena  no hematochezia  no chest pain  + palpitations  no sob  + dyspnea on exertion  no cough  no wheezing  no anorexia  no headache  no dizziness  no syncope  + weakness  no myalgia  no dysuria  no polyuria  no hematuria     Vital Signs Last 24 Hrs  T(C): 36.2 (23 Nov 2017 11:29), Max: 37.5 (22 Nov 2017 20:25)  T(F): 97.2 (23 Nov 2017 11:29), Max: 99.5 (22 Nov 2017 20:25)  HR: 75 (23 Nov 2017 11:29) (75 - 105)  BP: 121/86 (23 Nov 2017 11:29) (105/65 - 147/90)  BP(mean): 92 (22 Nov 2017 18:02) (92 - 92)  RR: 16 (23 Nov 2017 11:29) (16 - 20)  SpO2: 96% (23 Nov 2017 11:29) (94% - 98%)    I&O's Summary      PHYSICAL EXAM:    General Appearance:    Comfortable, AAO X 3, in no distress.   HEENT:                       Atraumatic, PERRLA, EOMI, conjunctiva clear.   Neck:                          Supple, no adenopathy, no thyromegaly, no JVD, no carotid bruit  Back:                          Symmetric, no CV angle fullness or tenderness, no soft tissue tenderness.  Chest:                         Clear to auscultation, no wheezes, rales or rhonchi, symmetric air entry, no tachypnea, retractions or cyanosis  Heart:                          S1, S2 irregular, no gallop, 2/6 murmur.  Abdomen:                    Soft, non-tender, bowel sounds active. No palpable masses.   Extremities:                 no cyanosis, + edema, no joint swelling. Peripheral pulses normal  Skin:                           Skin color, texture normal. No rashes   Neurologic:                  Grossly cranial nerves intact, sensory and motor normal.      TELEMETRY:  A flutter with variable block    ECG:  A flutter with variable block    LABS:                        13.5   10.7  )-----------( 183      ( 22 Nov 2017 10:40 )             39.8     11-23    144  |  104  |  25<H>  ----------------------------<  117<H>  4.1   |  34<H>  |  1.36<H>    Ca    8.3<L>      23 Nov 2017 06:33    TPro  7.2  /  Alb  3.5  /  TBili  0.7  /  DBili  x   /  AST  13<L>  /  ALT  18  /  AlkPhos  68  11-22    Lipid Panel  Chl 91  HDL 43  LDL 33  Trg 76      11-22 @ 19:30  Trop-I  <0.015  CK      --  CK-MB   --    11-22 @ 13:51  Trop-I  <0.015  CK      --  CK-MB   --    11-22 @ 11:42  Trop-I  <0.015  CK      37  CK-MB   --    Pro BNP  5444 11-22 @ 11:42  D Dimer  -- 11-22 @ 11:42    PT/INR - ( 23 Nov 2017 06:33 )   PT: 24.6 sec;   INR: 2.24 ratio    PTT - ( 23 Nov 2017 06:33 )  PTT:36.5 sec    RADIOLOGY & ADDITIONAL STUDIES:    Xray Chest 1 View AP/PA. (11.22.17 @ 10:59) >  IMPRESSION: Cardiomegaly with clear lungs

## 2017-11-23 NOTE — CONSULT NOTE ADULT - ASSESSMENT
Atrial flutter  Ac on Chr diastolic CHF  s/p PPM  HTN  HLD    Suggest:    Diuresis  Observe patient on telemetry.  follow low sodium, low cholesterol, ADA diet.  Fluid restriction 1.5 lit/day  monitor Intake & output  Daily weights.  Follow up electrolytes, renal function.   Get echocardiogram to evaluate left ventricular ejection fraction and valves.  Follow up cardiac enzymes  Treat with ACEi/ARBs and beta blcokers.  AFL rate should improve with CHF treatment.  Additional cardizem or digoxin for rate control if necessary

## 2017-11-24 ENCOUNTER — TRANSCRIPTION ENCOUNTER (OUTPATIENT)
Age: 82
End: 2017-11-24

## 2017-11-24 VITALS
OXYGEN SATURATION: 98 % | RESPIRATION RATE: 18 BRPM | SYSTOLIC BLOOD PRESSURE: 120 MMHG | TEMPERATURE: 97 F | HEART RATE: 96 BPM | DIASTOLIC BLOOD PRESSURE: 82 MMHG

## 2017-11-24 LAB
ANION GAP SERPL CALC-SCNC: 3 MMOL/L — LOW (ref 5–17)
BASOPHILS # BLD AUTO: 0.1 K/UL — SIGNIFICANT CHANGE UP (ref 0–0.2)
BASOPHILS NFR BLD AUTO: 0.8 % — SIGNIFICANT CHANGE UP (ref 0–2)
BUN SERPL-MCNC: 30 MG/DL — HIGH (ref 7–23)
CALCIUM SERPL-MCNC: 8.6 MG/DL — SIGNIFICANT CHANGE UP (ref 8.5–10.1)
CHLORIDE SERPL-SCNC: 105 MMOL/L — SIGNIFICANT CHANGE UP (ref 96–108)
CO2 SERPL-SCNC: 34 MMOL/L — HIGH (ref 22–31)
CREAT SERPL-MCNC: 1.38 MG/DL — HIGH (ref 0.5–1.3)
EOSINOPHIL # BLD AUTO: 0.2 K/UL — SIGNIFICANT CHANGE UP (ref 0–0.5)
EOSINOPHIL NFR BLD AUTO: 2.5 % — SIGNIFICANT CHANGE UP (ref 0–6)
GLUCOSE SERPL-MCNC: 116 MG/DL — HIGH (ref 70–99)
HCT VFR BLD CALC: 41.4 % — SIGNIFICANT CHANGE UP (ref 34.5–45)
HGB BLD-MCNC: 13.3 G/DL — SIGNIFICANT CHANGE UP (ref 11.5–15.5)
LYMPHOCYTES # BLD AUTO: 1.8 K/UL — SIGNIFICANT CHANGE UP (ref 1–3.3)
LYMPHOCYTES # BLD AUTO: 22.1 % — SIGNIFICANT CHANGE UP (ref 13–44)
MAGNESIUM SERPL-MCNC: 2.3 MG/DL — SIGNIFICANT CHANGE UP (ref 1.6–2.6)
MCHC RBC-ENTMCNC: 30.4 PG — SIGNIFICANT CHANGE UP (ref 27–34)
MCHC RBC-ENTMCNC: 32.1 GM/DL — SIGNIFICANT CHANGE UP (ref 32–36)
MCV RBC AUTO: 94.6 FL — SIGNIFICANT CHANGE UP (ref 80–100)
MONOCYTES # BLD AUTO: 0.6 K/UL — SIGNIFICANT CHANGE UP (ref 0–0.9)
MONOCYTES NFR BLD AUTO: 7.4 % — SIGNIFICANT CHANGE UP (ref 2–14)
NEUTROPHILS # BLD AUTO: 5.6 K/UL — SIGNIFICANT CHANGE UP (ref 1.8–7.4)
NEUTROPHILS NFR BLD AUTO: 67.2 % — SIGNIFICANT CHANGE UP (ref 43–77)
PHOSPHATE SERPL-MCNC: 3.4 MG/DL — SIGNIFICANT CHANGE UP (ref 2.5–4.5)
PLATELET # BLD AUTO: 177 K/UL — SIGNIFICANT CHANGE UP (ref 150–400)
POTASSIUM SERPL-MCNC: 3.8 MMOL/L — SIGNIFICANT CHANGE UP (ref 3.5–5.3)
POTASSIUM SERPL-SCNC: 3.8 MMOL/L — SIGNIFICANT CHANGE UP (ref 3.5–5.3)
RBC # BLD: 4.38 M/UL — SIGNIFICANT CHANGE UP (ref 3.8–5.2)
RBC # FLD: 14.9 % — HIGH (ref 10.3–14.5)
SODIUM SERPL-SCNC: 142 MMOL/L — SIGNIFICANT CHANGE UP (ref 135–145)
WBC # BLD: 8.3 K/UL — SIGNIFICANT CHANGE UP (ref 3.8–10.5)
WBC # FLD AUTO: 8.3 K/UL — SIGNIFICANT CHANGE UP (ref 3.8–10.5)

## 2017-11-24 RX ORDER — METOPROLOL TARTRATE 50 MG
1 TABLET ORAL
Qty: 0 | Refills: 0 | DISCHARGE
Start: 2017-11-24

## 2017-11-24 RX ORDER — APIXABAN 2.5 MG/1
1 TABLET, FILM COATED ORAL
Qty: 0 | Refills: 0 | COMMUNITY

## 2017-11-24 RX ORDER — METOPROLOL TARTRATE 50 MG
0 TABLET ORAL
Qty: 0 | Refills: 0 | COMMUNITY

## 2017-11-24 RX ORDER — DILTIAZEM HCL 120 MG
1 CAPSULE, EXT RELEASE 24 HR ORAL
Qty: 0 | Refills: 0 | COMMUNITY

## 2017-11-24 RX ORDER — LANOLIN ALCOHOL/MO/W.PET/CERES
3 CREAM (GRAM) TOPICAL ONCE
Qty: 0 | Refills: 0 | Status: COMPLETED | OUTPATIENT
Start: 2017-11-24 | End: 2017-11-24

## 2017-11-24 RX ORDER — APIXABAN 2.5 MG/1
1 TABLET, FILM COATED ORAL
Qty: 0 | Refills: 0 | DISCHARGE
Start: 2017-11-24

## 2017-11-24 RX ADMIN — Medication 88 MICROGRAM(S): at 05:49

## 2017-11-24 RX ADMIN — Medication 40 MILLIGRAM(S): at 05:49

## 2017-11-24 RX ADMIN — Medication 48 MILLIGRAM(S): at 10:59

## 2017-11-24 RX ADMIN — Medication 2000 UNIT(S): at 11:22

## 2017-11-24 RX ADMIN — Medication 50 MILLIGRAM(S): at 05:49

## 2017-11-24 RX ADMIN — APIXABAN 5 MILLIGRAM(S): 2.5 TABLET, FILM COATED ORAL at 05:49

## 2017-11-24 RX ADMIN — Medication 1 TABLET(S): at 05:49

## 2017-11-24 RX ADMIN — Medication 2 GRAM(S): at 11:00

## 2017-11-24 NOTE — PROGRESS NOTE ADULT - SUBJECTIVE AND OBJECTIVE BOX
CC:  Patient is a 84y old  Female who presents with a chief complaint of palpitations (22 Nov 2017 16:40)    SUBJECTIVE:  no new complaints.    ROS:  reviewed.    acetaminophen   Tablet 650 milliGRAM(s) Oral every 6 hours PRN  apixaban 5 milliGRAM(s) Oral every 12 hours  ascorbic acid 500 milliGRAM(s) Oral daily  calcium carbonate 500 mG (Tums) Chewable 1 Tablet(s) Chew three times a day  cholecalciferol 2000 Unit(s) Oral daily  fenofibrate Tablet 48 milliGRAM(s) Oral daily  furosemide    Tablet 40 milliGRAM(s) Oral two times a day  levothyroxine 88 MICROGram(s) Oral daily  metoprolol succinate  milliGRAM(s) Oral at bedtime  metoprolol succinate ER 50 milliGRAM(s) Oral daily  omega-3-Acid Ethyl Esters 2 Gram(s) Oral daily  ondansetron Injectable 4 milliGRAM(s) IV Push every 4 hours PRN    T(C): 36.4 (11-24-17 @ 05:40), Max: 36.7 (11-23-17 @ 17:44)  HR: 98 (11-24-17 @ 05:40) (75 - 98)  BP: 132/76 (11-24-17 @ 05:40) (108/59 - 132/76)  RR: 17 (11-24-17 @ 05:40) (16 - 18)  SpO2: 94% (11-24-17 @ 05:40) (94% - 96%)             PHYSICAL EXAM:  GENERAL: Comfortable, no acute distress  HEAD:  Atraumatic, Normocephalic  EYES: EOMI, PERRLA  HEENT: Moist mucous membranes  NECK: Supple, No JVD  NERVOUS SYSTEM:  Alert & Oriented X3, Motor Strength 5/5 B/L upper and lower extremities  CHEST/LUNG: Clear to auscultation bilaterally  HEART: Regular rate and rhythm;   ABDOMEN: Soft, Nontender, Nondistended; Bowel sounds present  GENITOURINARY- Voiding, no palpable bladder  EXTREMITIES:  No clubbing, cyanosis. +LE edema b/l  MUSCULOSKELETAL- right elbow erythema, FROM  SKIN-no rash               13.3   8.3   )-----------( 177      ( 24 Nov 2017 06:03 )             41.4     11-24    142  |  105  |  30<H>  ----------------------------<  116<H>  3.8   |  34<H>  |  1.38<H>    Ca    8.6      24 Nov 2017 06:03  Phos  3.4     11-24  Mg     2.3     11-24    TPro  7.2  /  Alb  3.5  /  TBili  0.7  /  DBili  x   /  AST  13<L>  /  ALT  18  /  AlkPhos  68  11-22

## 2017-11-24 NOTE — PROGRESS NOTE ADULT - SUBJECTIVE AND OBJECTIVE BOX
83 y/o female with a PMHx notable for HTN, HLD, chronic Atrial fibrillation on Eliquis, s/p pacemaker , and hypothyroidism, who presents to the ED with reports of palpitations.  Pt notes onset of palpitations x 3 weeks.  She also reports intermittent substernal CP, along with generalized fatigue more so than usual.  Recently evaluated at our office and was found to be tachycardic. She was started on Cardizem. She also has recently gained about 4-5 pounds of weight. No improvement with medications so she came to the ER for further evaluation. Denies fevers, chills, cough.  Also no dyspnea, N/V/D, syncope, or lightheadedness.      Today, she feels better. No new events. No orthopnea. Heart rate is better controlled    PAST MEDICAL & SURGICAL HISTORY:  Pacemaker  Atrial fibrillation  HLD (hyperlipidemia)  HTN (hypertension)  No significant past surgical history      PREVIOUS CARDIAC WORKUP:      Echo: 7/17. Nml EF, 2+ MR  Cardiac Cath: 5/12. normal cors      Allergies:   No Known Allergies      MEDICATIONS  (STANDING):  apixaban 5 milliGRAM(s) Oral every 12 hours  ascorbic acid 500 milliGRAM(s) Oral daily  calcium carbonate 500 mG (Tums) Chewable 1 Tablet(s) Chew three times a day  cholecalciferol 2000 Unit(s) Oral daily  fenofibrate Tablet 48 milliGRAM(s) Oral daily  furosemide    Tablet 40 milliGRAM(s) Oral two times a day  levothyroxine 88 MICROGram(s) Oral daily  metoprolol succinate  milliGRAM(s) Oral at bedtime  metoprolol succinate ER 50 milliGRAM(s) Oral daily  omega-3-Acid Ethyl Esters 2 Gram(s) Oral daily    MEDICATIONS  (PRN):  acetaminophen   Tablet 650 milliGRAM(s) Oral every 6 hours PRN For Temp greater than 38 C (100.4 F)  ondansetron Injectable 4 milliGRAM(s) IV Push every 4 hours PRN Nausea      ROS:     Detailed ten system ROS was performed and was negative except for history as eluded to above.    no fever  no chills  no nausea  no vomiting  no diarrhea  no constipation  no melena  no hematochezia  no chest pain  + palpitations  no sob  + dyspnea on exertion  no cough  no wheezing  no anorexia  no headache  no dizziness  no syncope  + weakness  no myalgia  no dysuria  no polyuria  no hematuria       Vital Signs Last 24 Hrs  T(C): 36.3 (24 Nov 2017 11:29), Max: 36.7 (23 Nov 2017 17:44)  T(F): 97.4 (24 Nov 2017 11:29), Max: 98.1 (23 Nov 2017 17:44)  HR: 96 (24 Nov 2017 11:29) (76 - 98)  BP: 120/82 (24 Nov 2017 11:29) (108/59 - 132/76)  BP(mean): 89 (24 Nov 2017 11:29) (69 - 89)  RR: 18 (24 Nov 2017 11:29) (17 - 18)  SpO2: 98% (24 Nov 2017 11:29) (94% - 98%)    I&O's Summary      PHYSICAL EXAM:    General Appearance:    Comfortable, AAO X 3, in no distress.   HEENT:                       Atraumatic, PERRLA, EOMI, conjunctiva clear.   Neck:                          Supple, no adenopathy, no thyromegaly, no JVD, no carotid bruit  Back:                          Symmetric, no CV angle fullness or tenderness, no soft tissue tenderness.  Chest:                         Clear to auscultation, no wheezes, rales or rhonchi, symmetric air entry, no tachypnea, retractions or cyanosis  Heart:                          S1, S2 irregular, no gallop, 2/6 murmur.  Abdomen:                    Soft, non-tender, bowel sounds active. No palpable masses.   Extremities:                 no cyanosis, + edema, no joint swelling. Peripheral pulses normal  Skin:                           Skin color, texture normal. No rashes   Neurologic:                  Grossly cranial nerves intact, sensory and motor normal.      TELEMETRY:  A flutter with variable block    ECG:  A flutter with variable block    LABS:                        13.3   8.3   )-----------( 177      ( 24 Nov 2017 06:03 )             41.4     11-24    142  |  105  |  30<H>  ----------------------------<  116<H>  3.8   |  34<H>  |  1.38<H>    Ca    8.6      24 Nov 2017 06:03  Phos  3.4     11-24  Mg     2.3     11-24    TPro  7.2  /  Alb  3.5  /  TBili  0.7  /  DBili  x   /  AST  13<L>  /  ALT  18  /  AlkPhos  68  11-22      Lipid Panel  Chl 91  HDL 43  LDL 33  Trg 76        11-22 @ 19:30  Trop-I <0.015  CK     --  CK MB  --    11-22 @ 13:51  Trop-I <0.015  CK     --  CK MB  --    11-22 @ 11:42  Trop-I <0.015  CK     37  CK MB  --    Pro BNP  5444 11-22 @ 11:42  D Dimer  -- 11-22 @ 11:42    PT/INR - ( 23 Nov 2017 06:33 )   PT: 24.6 sec;   INR: 2.24 ratio         PTT - ( 23 Nov 2017 06:33 )  PTT:36.5 sec

## 2017-11-24 NOTE — PROGRESS NOTE ADULT - ASSESSMENT
84F.  admitted November 22nd.  presented to ED c/o chest palpitations w/ a duration of three weeks.  a/w substernal chest pain and generalized fatigue.  telemetry in the ED reveled atrial flutter.  diltiazem IV was administered w/ good response.     PMHx:  AF (Eliquis)-PPM;  HTN;  hyperlipidemia;  hypothyroidism.      atrial flutter w/ rapid ventricular response.  -TTE.  -Eliquis.  -Cardiology following.    peripheral LE edema.  -r/o acute upon chronic HFpEF.  -TTE.  -Lasix.    acute olecranon bursitis.  -supportive measures.    hx hyperlipidemia.  -lifestyle modifications.  -c/w statin and Tricor.    hx HTN.  -controlled.  -c/w NDP-CCB + BB.    hx hypothyroidism.  -TSH wnl.  -c/w levothyroxin.    disposition.  -telemetry robison (3E).

## 2017-11-24 NOTE — DISCHARGE NOTE ADULT - HOSPITAL COURSE
CC:  Patient is a 84y old  Female who presents with a chief complaint of palpitations (22 Nov 2017 16:40)    SUBJECTIVE:   at bedside.  no new complaints.    ROS:  reviewed.    acetaminophen   Tablet 650 milliGRAM(s) Oral every 6 hours PRN  apixaban 5 milliGRAM(s) Oral every 12 hours  ascorbic acid 500 milliGRAM(s) Oral daily  calcium carbonate 500 mG (Tums) Chewable 1 Tablet(s) Chew three times a day  cholecalciferol 2000 Unit(s) Oral daily  fenofibrate Tablet 48 milliGRAM(s) Oral daily  furosemide    Tablet 40 milliGRAM(s) Oral two times a day  levothyroxine 88 MICROGram(s) Oral daily  metoprolol succinate  milliGRAM(s) Oral at bedtime  metoprolol succinate ER 50 milliGRAM(s) Oral daily  omega-3-Acid Ethyl Esters 2 Gram(s) Oral daily  ondansetron Injectable 4 milliGRAM(s) IV Push every 4 hours PRN    T(C): 36.3 (11-24-17 @ 11:29), Max: 36.7 (11-23-17 @ 17:44)  HR: 96 (11-24-17 @ 11:29) (76 - 98)  BP: 120/82 (11-24-17 @ 11:29) (108/59 - 132/76)  RR: 18 (11-24-17 @ 11:29) (17 - 18)  SpO2: 98% (11-24-17 @ 11:29) (94% - 98%)    PHYSICAL EXAM:  GENERAL: Comfortable, no acute distress  HEAD:  Atraumatic, Normocephalic  EYES: EOMI, PERRLA  HEENT: Moist mucous membranes  NECK: Supple, No JVD  NERVOUS SYSTEM:  Alert & Oriented X3, Motor Strength 5/5 B/L upper and lower extremities  CHEST/LUNG: Clear to auscultation bilaterally  HEART: Regular rate and rhythm;   ABDOMEN: Soft, Nontender, Nondistended; Bowel sounds present  GENITOURINARY- Voiding, no palpable bladder  EXTREMITIES:  No clubbing, cyanosis. +LE edema b/l  MUSCULOSKELETAL- right elbow erythema, FROM  SKIN-no rash               13.3   8.3   )-----------( 177      ( 24 Nov 2017 06:03 )             41.4     11-24    142  |  105  |  30<H>  ----------------------------<  116<H>  3.8   |  34<H>  |  1.38<H>    Ca    8.6      24 Nov 2017 06:03  Phos  3.4     11-24  Mg     2.3     11-24    84F.  admitted November 22nd.  presented to ED c/o chest palpitations w/ a duration of three weeks.  a/w substernal chest pain and generalized fatigue.  telemetry in the ED reveled atrial flutter.  diltiazem IV was administered w/ good response.     PMHx:  AF (Eliquis)-PPM;  HTN;  hyperlipidemia;  hypothyroidism.      atrial flutter w/ rapid ventricular response.  -denies subjective symptoms.  -HR better controlled.    -metoprolol.  -Eliquis.  -Cardiology f/u outpatient.    peripheral LE edema.  -r/o acute upon chronic HFpEF.  -c/w Lasix.    acute olecranon bursitis.  -supportive measures.    hx hyperlipidemia.  -lifestyle modifications.  -c/w statin and Tricor.    hx HTN.  -controlled.  -c/w BB.    hx hypothyroidism.  -TSH wnl.  -c/w levothyroxin.      disposition.  -d/c home today.  -d/c > 35 minutes.    communication.  -d/w RN.  -d/w Case Management.  -d/w Cardiology.  -PMD, Dr. Walter Schumacher.  message left with service.  call back requested.

## 2017-11-24 NOTE — DISCHARGE NOTE ADULT - CARE PLAN
Principal Discharge DX:	Atrial fibrillation  Goal:	see below  Instructions for follow-up, activity and diet:	continue with Eliquis, metoprolol and Lasix.  follow up with Dr. Akins on Tuesday.

## 2017-11-24 NOTE — DISCHARGE NOTE ADULT - MEDICATION SUMMARY - MEDICATIONS TO STOP TAKING
I will STOP taking the medications listed below when I get home from the hospital:    dilTIAZem 180 mg/24 hours oral tablet, extended release  -- 1 tab(s) by mouth once a day

## 2017-11-24 NOTE — PROGRESS NOTE ADULT - ASSESSMENT
Atrial flutter  Ac on Chr diastolic CHF  s/p PPM  HTN  HLD    Suggest:    Diuresis - PO Lasix  follow low sodium, low cholesterol, ADA diet.  Fluid restriction 1.5 lit/day  monitor Intake & output  Daily weights.  Treat with ACEi/ARBs and beta blockers.  AFL rate improved with CHF treatment.  Discharge planning and follow up with Dr Akins on  Tuesday 11/29/17

## 2017-11-24 NOTE — DISCHARGE NOTE ADULT - CARE PROVIDER_API CALL
Walter Schumacher), Internal Medicine  [address] [place] [address]  Phone: [phone]  Fax: [phone]    Milo Akins (MD), Cardiovascular Disease; Internal Medicine  [address] [place] [address]  Phone: [phone]  Fax: [phone]

## 2017-11-24 NOTE — DISCHARGE NOTE ADULT - PATIENT PORTAL LINK FT
“You can access the [employer] Patient Portal, offered by [facility] [place], by registering with the following website: [email or web address]”

## 2017-11-24 NOTE — DISCHARGE NOTE ADULT - MEDICATION SUMMARY - MEDICATIONS TO TAKE
I will START or STAY ON the medications listed below when I get home from the hospital:    Caltrate 600 mg oral tablet  -- 1 tab(s) by mouth 3 times a day  -- Indication: For supplement    apixaban 5 mg oral tablet  -- 1 tab(s) by mouth every 12 hours  -- Indication: For Atrial fibrillation    fenofibrate 48 mg oral tablet  -- 1 tab(s) by mouth once a day  -- Indication: For HLD (hyperlipidemia)    metoprolol succinate 50 mg oral tablet, extended release  -- 1 tab(s) by mouth once a day  -- Indication: For Atrial fibrillation    metoprolol succinate 100 mg oral tablet, extended release  -- 1 tab(s) by mouth once a day (at bedtime)  -- Indication: For Atrial fibrillation    ibandronate 150 mg oral tablet  -- 1 tab(s) by mouth once a month on the 5th  -- Indication: For osteoporosis    Lasix 40 mg oral tablet  -- 1 tab(s) by mouth 2 times a day  -- Indication: For congestive heart failure    Klor-Con 10 oral tablet, extended release  -- 1 tab(s) by mouth 2 times a day  -- Indication: For supplement    Fish Oil 1000 mg oral capsule  -- 1 cap(s) by mouth once a day  -- Indication: For supplement    levothyroxine 88 mcg (0.088 mg) oral capsule  -- 1 tab(s) by mouth once a day  -- Indication: For Hypothyroidism, unspecified type    ascorbic acid 500 mg oral tablet  -- 1 tab(s) by mouth once a day  -- Indication: For supplement    cholecalciferol 2000 intl units oral tablet  -- 1 tab(s) by mouth once a day  -- Indication: For supplement

## 2017-11-28 DIAGNOSIS — M70.21 OLECRANON BURSITIS, RIGHT ELBOW: ICD-10-CM

## 2017-11-28 DIAGNOSIS — I48.4 ATYPICAL ATRIAL FLUTTER: ICD-10-CM

## 2017-11-28 DIAGNOSIS — E03.9 HYPOTHYROIDISM, UNSPECIFIED: ICD-10-CM

## 2017-11-28 DIAGNOSIS — E78.5 HYPERLIPIDEMIA, UNSPECIFIED: ICD-10-CM

## 2017-11-28 DIAGNOSIS — Z79.01 LONG TERM (CURRENT) USE OF ANTICOAGULANTS: ICD-10-CM

## 2017-11-28 DIAGNOSIS — Z95.0 PRESENCE OF CARDIAC PACEMAKER: ICD-10-CM

## 2017-11-28 DIAGNOSIS — I11.0 HYPERTENSIVE HEART DISEASE WITH HEART FAILURE: ICD-10-CM

## 2017-11-28 DIAGNOSIS — I48.2 CHRONIC ATRIAL FIBRILLATION: ICD-10-CM

## 2017-11-28 DIAGNOSIS — I50.33 ACUTE ON CHRONIC DIASTOLIC (CONGESTIVE) HEART FAILURE: ICD-10-CM

## 2018-01-18 ENCOUNTER — APPOINTMENT (OUTPATIENT)
Dept: ELECTROPHYSIOLOGY | Facility: CLINIC | Age: 83
End: 2018-01-18
Payer: MEDICARE

## 2018-01-18 VITALS
BODY MASS INDEX: 31.04 KG/M2 | WEIGHT: 154 LBS | TEMPERATURE: 97.6 F | DIASTOLIC BLOOD PRESSURE: 71 MMHG | HEIGHT: 59 IN | HEART RATE: 63 BPM | SYSTOLIC BLOOD PRESSURE: 148 MMHG

## 2018-01-18 PROBLEM — I48.91 UNSPECIFIED ATRIAL FIBRILLATION: Chronic | Status: ACTIVE | Noted: 2017-11-22

## 2018-01-18 PROBLEM — I10 ESSENTIAL (PRIMARY) HYPERTENSION: Chronic | Status: ACTIVE | Noted: 2017-11-22

## 2018-01-18 PROBLEM — E78.5 HYPERLIPIDEMIA, UNSPECIFIED: Chronic | Status: ACTIVE | Noted: 2017-11-22

## 2018-01-18 PROBLEM — Z95.0 PRESENCE OF CARDIAC PACEMAKER: Chronic | Status: ACTIVE | Noted: 2017-11-22

## 2018-01-18 PROCEDURE — 93280 PM DEVICE PROGR EVAL DUAL: CPT

## 2018-06-05 ENCOUNTER — APPOINTMENT (OUTPATIENT)
Dept: MAMMOGRAPHY | Facility: CLINIC | Age: 83
End: 2018-06-05
Payer: MEDICARE

## 2018-06-05 ENCOUNTER — OUTPATIENT (OUTPATIENT)
Dept: OUTPATIENT SERVICES | Facility: HOSPITAL | Age: 83
LOS: 1 days | End: 2018-06-05
Payer: MEDICARE

## 2018-06-05 DIAGNOSIS — Z00.8 ENCOUNTER FOR OTHER GENERAL EXAMINATION: ICD-10-CM

## 2018-06-05 PROCEDURE — 77067 SCR MAMMO BI INCL CAD: CPT | Mod: 26

## 2018-06-05 PROCEDURE — 77063 BREAST TOMOSYNTHESIS BI: CPT

## 2018-06-05 PROCEDURE — 77067 SCR MAMMO BI INCL CAD: CPT

## 2018-06-05 PROCEDURE — 77063 BREAST TOMOSYNTHESIS BI: CPT | Mod: 26

## 2018-07-19 ENCOUNTER — APPOINTMENT (OUTPATIENT)
Dept: ELECTROPHYSIOLOGY | Facility: CLINIC | Age: 83
End: 2018-07-19
Payer: MEDICARE

## 2018-07-19 VITALS
DIASTOLIC BLOOD PRESSURE: 71 MMHG | WEIGHT: 148 LBS | BODY MASS INDEX: 29.84 KG/M2 | SYSTOLIC BLOOD PRESSURE: 139 MMHG | HEIGHT: 59 IN | HEART RATE: 70 BPM

## 2018-07-19 PROCEDURE — 93280 PM DEVICE PROGR EVAL DUAL: CPT

## 2019-01-11 RX ORDER — OFLOXACIN 0.3 %
1 DROPS OPHTHALMIC (EYE)
Qty: 0 | Refills: 0 | Status: DISCONTINUED | OUTPATIENT
Start: 2019-01-14 | End: 2019-01-29

## 2019-01-11 RX ORDER — CYCLOPENTOLATE HYDROCHLORIDE 10 MG/ML
1 SOLUTION/ DROPS OPHTHALMIC
Qty: 0 | Refills: 0 | Status: DISCONTINUED | OUTPATIENT
Start: 2019-01-14 | End: 2019-01-29

## 2019-01-11 RX ORDER — PHENYLEPHRINE HCL 2.5 %
1 DROPS OPHTHALMIC (EYE)
Qty: 0 | Refills: 0 | Status: DISCONTINUED | OUTPATIENT
Start: 2019-01-14 | End: 2019-01-29

## 2019-01-14 ENCOUNTER — OUTPATIENT (OUTPATIENT)
Dept: OUTPATIENT SERVICES | Facility: HOSPITAL | Age: 84
LOS: 1 days | Discharge: HOME | End: 2019-01-14

## 2019-01-14 VITALS
RESPIRATION RATE: 16 BRPM | TEMPERATURE: 98 F | DIASTOLIC BLOOD PRESSURE: 96 MMHG | HEART RATE: 62 BPM | SYSTOLIC BLOOD PRESSURE: 165 MMHG | HEIGHT: 59 IN | OXYGEN SATURATION: 100 % | WEIGHT: 160.06 LBS

## 2019-01-14 VITALS
DIASTOLIC BLOOD PRESSURE: 54 MMHG | HEART RATE: 59 BPM | OXYGEN SATURATION: 100 % | SYSTOLIC BLOOD PRESSURE: 132 MMHG | TEMPERATURE: 98 F | RESPIRATION RATE: 16 BRPM

## 2019-01-14 RX ORDER — ACETAMINOPHEN 500 MG
2 TABLET ORAL
Qty: 0 | Refills: 0 | DISCHARGE
Start: 2019-01-14

## 2019-01-14 RX ORDER — OMEGA-3 ACID ETHYL ESTERS 1 G
1 CAPSULE ORAL
Qty: 0 | Refills: 0 | COMMUNITY

## 2019-01-14 RX ORDER — TROPICAMIDE 1 %
1 DROPS OPHTHALMIC (EYE)
Qty: 0 | Refills: 0 | Status: COMPLETED | OUTPATIENT
Start: 2019-01-14 | End: 2019-01-14

## 2019-01-14 RX ORDER — ACETAMINOPHEN 500 MG
650 TABLET ORAL EVERY 6 HOURS
Qty: 0 | Refills: 0 | Status: DISCONTINUED | OUTPATIENT
Start: 2019-01-14 | End: 2019-01-29

## 2019-01-14 RX ADMIN — CYCLOPENTOLATE HYDROCHLORIDE 1 DROP(S): 10 SOLUTION/ DROPS OPHTHALMIC at 13:27

## 2019-01-14 RX ADMIN — CYCLOPENTOLATE HYDROCHLORIDE 1 DROP(S): 10 SOLUTION/ DROPS OPHTHALMIC at 13:28

## 2019-01-14 RX ADMIN — Medication 1 DROP(S): at 13:22

## 2019-01-14 RX ADMIN — Medication 1 DROP(S): at 13:12

## 2019-01-14 RX ADMIN — Medication 1 DROP(S): at 13:29

## 2019-01-14 RX ADMIN — Medication 1 DROP(S): at 13:28

## 2019-01-14 NOTE — ASU DISCHARGE PLAN (ADULT/PEDIATRIC). - MEDICATION SUMMARY - MEDICATIONS TO TAKE
I will START or STAY ON the medications listed below when I get home from the hospital:    acetaminophen 325 mg oral tablet  -- 2 tab(s) by mouth every 6 hours, As needed, Moderate Pain (4 - 6)  -- Indication: For md    Caltrate 600 mg oral tablet  -- 1 tab(s) by mouth 3 times a day  -- Indication: For md    apixaban 5 mg oral tablet  -- 1 tab(s) by mouth every 12 hours  -- Indication: For md    fenofibrate 48 mg oral tablet  -- 1 tab(s) by mouth once a day  -- Indication: For md    metoprolol succinate 50 mg oral tablet, extended release  -- 1 tab(s) by mouth once a day  -- Indication: For md    metoprolol succinate 100 mg oral tablet, extended release  -- 1 tab(s) by mouth once a day (at bedtime)  -- Indication: For md    ibandronate 150 mg oral tablet  -- 1 tab(s) by mouth once a month on the 5th  -- Indication: For md    Lasix 40 mg oral tablet  -- 1 tab(s) by mouth 2 times a day  -- Indication: For md    Klor-Con 10 oral tablet, extended release  -- 1 tab(s) by mouth 2 times a day  -- Indication: For md    levothyroxine 88 mcg (0.088 mg) oral capsule  -- 1 tab(s) by mouth once a day  -- Indication: For md    ascorbic acid 500 mg oral tablet  -- 1 tab(s) by mouth once a day  -- Indication: For md    cholecalciferol 2000 intl units oral tablet  -- 1 tab(s) by mouth once a day  -- Indication: For md

## 2019-01-17 DIAGNOSIS — H26.9 UNSPECIFIED CATARACT: ICD-10-CM

## 2019-01-17 DIAGNOSIS — I10 ESSENTIAL (PRIMARY) HYPERTENSION: ICD-10-CM

## 2019-01-17 DIAGNOSIS — Z79.82 LONG TERM (CURRENT) USE OF ASPIRIN: ICD-10-CM

## 2019-01-17 DIAGNOSIS — I48.91 UNSPECIFIED ATRIAL FIBRILLATION: ICD-10-CM

## 2019-01-17 DIAGNOSIS — E78.5 HYPERLIPIDEMIA, UNSPECIFIED: ICD-10-CM

## 2019-01-17 DIAGNOSIS — E89.0 POSTPROCEDURAL HYPOTHYROIDISM: ICD-10-CM

## 2019-01-17 DIAGNOSIS — I50.9 HEART FAILURE, UNSPECIFIED: ICD-10-CM

## 2019-01-17 DIAGNOSIS — Z95.0 PRESENCE OF CARDIAC PACEMAKER: ICD-10-CM

## 2019-01-24 ENCOUNTER — APPOINTMENT (OUTPATIENT)
Dept: ELECTROPHYSIOLOGY | Facility: CLINIC | Age: 84
End: 2019-01-24
Payer: MEDICARE

## 2019-01-24 VITALS
HEIGHT: 59 IN | BODY MASS INDEX: 29.84 KG/M2 | WEIGHT: 148 LBS | SYSTOLIC BLOOD PRESSURE: 121 MMHG | HEART RATE: 78 BPM | DIASTOLIC BLOOD PRESSURE: 61 MMHG

## 2019-01-24 PROCEDURE — 93280 PM DEVICE PROGR EVAL DUAL: CPT

## 2019-06-24 ENCOUNTER — INPATIENT (INPATIENT)
Facility: HOSPITAL | Age: 84
LOS: 6 days | Discharge: NURSING, REHAB OR LONG-TERM CARE | End: 2019-07-01
Attending: FAMILY MEDICINE | Admitting: FAMILY MEDICINE
Payer: MEDICARE

## 2019-06-24 VITALS
RESPIRATION RATE: 16 BRPM | SYSTOLIC BLOOD PRESSURE: 133 MMHG | HEIGHT: 65 IN | HEART RATE: 73 BPM | TEMPERATURE: 97 F | DIASTOLIC BLOOD PRESSURE: 70 MMHG | OXYGEN SATURATION: 92 % | WEIGHT: 130.07 LBS

## 2019-06-24 LAB
ADD ON TEST-SPECIMEN IN LAB: SIGNIFICANT CHANGE UP
ALBUMIN SERPL ELPH-MCNC: 4.1 G/DL — SIGNIFICANT CHANGE UP (ref 3.3–5)
ALP SERPL-CCNC: 66 U/L — SIGNIFICANT CHANGE UP (ref 40–120)
ALT FLD-CCNC: 20 U/L — SIGNIFICANT CHANGE UP (ref 12–78)
ANION GAP SERPL CALC-SCNC: 9 MMOL/L — SIGNIFICANT CHANGE UP (ref 5–17)
APPEARANCE UR: ABNORMAL
APTT BLD: 36.7 SEC — HIGH (ref 27.5–36.3)
AST SERPL-CCNC: 23 U/L — SIGNIFICANT CHANGE UP (ref 15–37)
BACTERIA # UR AUTO: ABNORMAL
BASOPHILS # BLD AUTO: 0.06 K/UL — SIGNIFICANT CHANGE UP (ref 0–0.2)
BASOPHILS NFR BLD AUTO: 0.4 % — SIGNIFICANT CHANGE UP (ref 0–2)
BILIRUB SERPL-MCNC: 0.9 MG/DL — SIGNIFICANT CHANGE UP (ref 0.2–1.2)
BILIRUB UR-MCNC: NEGATIVE — SIGNIFICANT CHANGE UP
BLD GP AB SCN SERPL QL: SIGNIFICANT CHANGE UP
BUN SERPL-MCNC: 57 MG/DL — HIGH (ref 7–23)
CALCIUM SERPL-MCNC: 15.5 MG/DL — CRITICAL HIGH (ref 8.5–10.1)
CHLORIDE SERPL-SCNC: 97 MMOL/L — SIGNIFICANT CHANGE UP (ref 96–108)
CO2 SERPL-SCNC: 34 MMOL/L — HIGH (ref 22–31)
COLOR SPEC: YELLOW — SIGNIFICANT CHANGE UP
COMMENT - URINE: SIGNIFICANT CHANGE UP
CREAT SERPL-MCNC: 2.27 MG/DL — HIGH (ref 0.5–1.3)
DIFF PNL FLD: ABNORMAL
EOSINOPHIL # BLD AUTO: 0.03 K/UL — SIGNIFICANT CHANGE UP (ref 0–0.5)
EOSINOPHIL NFR BLD AUTO: 0.2 % — SIGNIFICANT CHANGE UP (ref 0–6)
EPI CELLS # UR: ABNORMAL
GLUCOSE SERPL-MCNC: 117 MG/DL — HIGH (ref 70–99)
GLUCOSE UR QL: NEGATIVE MG/DL — SIGNIFICANT CHANGE UP
HCT VFR BLD CALC: 53.8 % — HIGH (ref 34.5–45)
HGB BLD-MCNC: 17.9 G/DL — HIGH (ref 11.5–15.5)
IMM GRANULOCYTES NFR BLD AUTO: 0.7 % — SIGNIFICANT CHANGE UP (ref 0–1.5)
INR BLD: 2.32 RATIO — HIGH (ref 0.88–1.16)
KETONES UR-MCNC: NEGATIVE — SIGNIFICANT CHANGE UP
LEUKOCYTE ESTERASE UR-ACNC: ABNORMAL
LYMPHOCYTES # BLD AUTO: 14.3 % — SIGNIFICANT CHANGE UP (ref 13–44)
LYMPHOCYTES # BLD AUTO: 2.35 K/UL — SIGNIFICANT CHANGE UP (ref 1–3.3)
MCHC RBC-ENTMCNC: 31.1 PG — SIGNIFICANT CHANGE UP (ref 27–34)
MCHC RBC-ENTMCNC: 33.3 GM/DL — SIGNIFICANT CHANGE UP (ref 32–36)
MCV RBC AUTO: 93.4 FL — SIGNIFICANT CHANGE UP (ref 80–100)
MONOCYTES # BLD AUTO: 1.01 K/UL — HIGH (ref 0–0.9)
MONOCYTES NFR BLD AUTO: 6.1 % — SIGNIFICANT CHANGE UP (ref 2–14)
NEUTROPHILS # BLD AUTO: 12.89 K/UL — HIGH (ref 1.8–7.4)
NEUTROPHILS NFR BLD AUTO: 78.3 % — HIGH (ref 43–77)
NITRITE UR-MCNC: NEGATIVE — SIGNIFICANT CHANGE UP
NT-PROBNP SERPL-SCNC: 8265 PG/ML — HIGH (ref 0–450)
PH UR: 5 — SIGNIFICANT CHANGE UP (ref 5–8)
PLATELET # BLD AUTO: 299 K/UL — SIGNIFICANT CHANGE UP (ref 150–400)
POTASSIUM SERPL-MCNC: 3.6 MMOL/L — SIGNIFICANT CHANGE UP (ref 3.5–5.3)
POTASSIUM SERPL-SCNC: 3.6 MMOL/L — SIGNIFICANT CHANGE UP (ref 3.5–5.3)
PROT SERPL-MCNC: 7.9 GM/DL — SIGNIFICANT CHANGE UP (ref 6–8.3)
PROT UR-MCNC: 30 MG/DL
PROTHROM AB SERPL-ACNC: 26.4 SEC — HIGH (ref 10–12.9)
RBC # BLD: 5.76 M/UL — HIGH (ref 3.8–5.2)
RBC # FLD: 15.4 % — HIGH (ref 10.3–14.5)
RBC CASTS # UR COMP ASSIST: SIGNIFICANT CHANGE UP /HPF (ref 0–4)
SODIUM SERPL-SCNC: 140 MMOL/L — SIGNIFICANT CHANGE UP (ref 135–145)
SP GR SPEC: 1.02 — SIGNIFICANT CHANGE UP (ref 1.01–1.02)
TROPONIN I SERPL-MCNC: 0.07 NG/ML — HIGH (ref 0.01–0.04)
TROPONIN I SERPL-MCNC: 0.09 NG/ML — HIGH (ref 0.01–0.04)
TSH SERPL-MCNC: 0.44 UU/ML — SIGNIFICANT CHANGE UP (ref 0.34–4.82)
TYPE + AB SCN PNL BLD: SIGNIFICANT CHANGE UP
UROBILINOGEN FLD QL: NEGATIVE MG/DL — SIGNIFICANT CHANGE UP
WBC # BLD: 16.45 K/UL — HIGH (ref 3.8–10.5)
WBC # FLD AUTO: 16.45 K/UL — HIGH (ref 3.8–10.5)
WBC UR QL: SIGNIFICANT CHANGE UP

## 2019-06-24 PROCEDURE — 72125 CT NECK SPINE W/O DYE: CPT | Mod: 26

## 2019-06-24 PROCEDURE — 73610 X-RAY EXAM OF ANKLE: CPT | Mod: 26,RT

## 2019-06-24 PROCEDURE — 72170 X-RAY EXAM OF PELVIS: CPT | Mod: 26

## 2019-06-24 PROCEDURE — 73552 X-RAY EXAM OF FEMUR 2/>: CPT | Mod: 26,50

## 2019-06-24 PROCEDURE — 71045 X-RAY EXAM CHEST 1 VIEW: CPT | Mod: 26

## 2019-06-24 PROCEDURE — 70450 CT HEAD/BRAIN W/O DYE: CPT | Mod: 26

## 2019-06-24 PROCEDURE — 93010 ELECTROCARDIOGRAM REPORT: CPT

## 2019-06-24 PROCEDURE — 99285 EMERGENCY DEPT VISIT HI MDM: CPT

## 2019-06-24 PROCEDURE — 73562 X-RAY EXAM OF KNEE 3: CPT | Mod: 26,50

## 2019-06-24 RX ORDER — SODIUM CHLORIDE 9 MG/ML
1000 INJECTION INTRAMUSCULAR; INTRAVENOUS; SUBCUTANEOUS
Refills: 0 | Status: DISCONTINUED | OUTPATIENT
Start: 2019-06-24 | End: 2019-06-25

## 2019-06-24 RX ORDER — LEVOTHYROXINE SODIUM 125 MCG
1 TABLET ORAL
Qty: 0 | Refills: 0 | DISCHARGE

## 2019-06-24 RX ORDER — HEPARIN SODIUM 5000 [USP'U]/ML
4500 INJECTION INTRAVENOUS; SUBCUTANEOUS EVERY 6 HOURS
Refills: 0 | Status: DISCONTINUED | OUTPATIENT
Start: 2019-06-24 | End: 2019-06-25

## 2019-06-24 RX ORDER — CHOLECALCIFEROL (VITAMIN D3) 125 MCG
1 CAPSULE ORAL
Qty: 0 | Refills: 0 | DISCHARGE

## 2019-06-24 RX ORDER — HEPARIN SODIUM 5000 [USP'U]/ML
INJECTION INTRAVENOUS; SUBCUTANEOUS
Qty: 25000 | Refills: 0 | Status: DISCONTINUED | OUTPATIENT
Start: 2019-06-24 | End: 2019-06-25

## 2019-06-24 RX ORDER — DOCUSATE SODIUM 100 MG
100 CAPSULE ORAL THREE TIMES A DAY
Refills: 0 | Status: DISCONTINUED | OUTPATIENT
Start: 2019-06-24 | End: 2019-07-01

## 2019-06-24 RX ORDER — FENOFIBRATE,MICRONIZED 130 MG
48 CAPSULE ORAL DAILY
Refills: 0 | Status: DISCONTINUED | OUTPATIENT
Start: 2019-06-24 | End: 2019-07-01

## 2019-06-24 RX ORDER — IBANDRONATE SODIUM 150 MG/1
1 TABLET ORAL
Qty: 0 | Refills: 0 | DISCHARGE

## 2019-06-24 RX ORDER — HEPARIN SODIUM 5000 [USP'U]/ML
2000 INJECTION INTRAVENOUS; SUBCUTANEOUS EVERY 6 HOURS
Refills: 0 | Status: DISCONTINUED | OUTPATIENT
Start: 2019-06-24 | End: 2019-06-25

## 2019-06-24 RX ORDER — CEFTRIAXONE 500 MG/1
1000 INJECTION, POWDER, FOR SOLUTION INTRAMUSCULAR; INTRAVENOUS ONCE
Refills: 0 | Status: COMPLETED | OUTPATIENT
Start: 2019-06-24 | End: 2019-06-24

## 2019-06-24 RX ORDER — SENNA PLUS 8.6 MG/1
2 TABLET ORAL AT BEDTIME
Refills: 0 | Status: DISCONTINUED | OUTPATIENT
Start: 2019-06-24 | End: 2019-07-01

## 2019-06-24 RX ORDER — ASCORBIC ACID 60 MG
1 TABLET,CHEWABLE ORAL
Qty: 0 | Refills: 0 | DISCHARGE

## 2019-06-24 RX ORDER — ACETAMINOPHEN 500 MG
650 TABLET ORAL EVERY 6 HOURS
Refills: 0 | Status: DISCONTINUED | OUTPATIENT
Start: 2019-06-24 | End: 2019-07-01

## 2019-06-24 RX ORDER — METOPROLOL TARTRATE 50 MG
100 TABLET ORAL
Refills: 0 | Status: DISCONTINUED | OUTPATIENT
Start: 2019-06-24 | End: 2019-07-01

## 2019-06-24 RX ORDER — FENOFIBRATE,MICRONIZED 130 MG
1 CAPSULE ORAL
Qty: 0 | Refills: 0 | DISCHARGE

## 2019-06-24 RX ORDER — CLOPIDOGREL BISULFATE 75 MG/1
1 TABLET, FILM COATED ORAL
Qty: 0 | Refills: 0 | DISCHARGE

## 2019-06-24 RX ORDER — SODIUM CHLORIDE 9 MG/ML
1000 INJECTION INTRAMUSCULAR; INTRAVENOUS; SUBCUTANEOUS ONCE
Refills: 0 | Status: COMPLETED | OUTPATIENT
Start: 2019-06-24 | End: 2019-06-24

## 2019-06-24 RX ORDER — ACETAMINOPHEN 500 MG
1000 TABLET ORAL ONCE
Refills: 0 | Status: COMPLETED | OUTPATIENT
Start: 2019-06-24 | End: 2019-06-24

## 2019-06-24 RX ORDER — CALCITONIN SALMON 200 [IU]/ML
240 INJECTION, SOLUTION INTRAMUSCULAR EVERY 12 HOURS
Refills: 0 | Status: COMPLETED | OUTPATIENT
Start: 2019-06-24 | End: 2019-06-26

## 2019-06-24 RX ORDER — ATORVASTATIN CALCIUM 80 MG/1
10 TABLET, FILM COATED ORAL AT BEDTIME
Refills: 0 | Status: DISCONTINUED | OUTPATIENT
Start: 2019-06-24 | End: 2019-07-01

## 2019-06-24 RX ORDER — LEVOTHYROXINE SODIUM 125 MCG
88 TABLET ORAL DAILY
Refills: 0 | Status: DISCONTINUED | OUTPATIENT
Start: 2019-06-24 | End: 2019-07-01

## 2019-06-24 RX ORDER — CALCIUM CARBONATE 500(1250)
1 TABLET ORAL
Qty: 0 | Refills: 0 | DISCHARGE

## 2019-06-24 RX ORDER — POTASSIUM CHLORIDE 20 MEQ
1 PACKET (EA) ORAL
Qty: 0 | Refills: 0 | DISCHARGE

## 2019-06-24 RX ORDER — CLOPIDOGREL BISULFATE 75 MG/1
75 TABLET, FILM COATED ORAL
Refills: 0 | Status: DISCONTINUED | OUTPATIENT
Start: 2019-06-24 | End: 2019-06-26

## 2019-06-24 RX ORDER — LOSARTAN POTASSIUM 100 MG/1
1 TABLET, FILM COATED ORAL
Qty: 0 | Refills: 0 | DISCHARGE

## 2019-06-24 RX ADMIN — Medication 1000 MILLIGRAM(S): at 14:00

## 2019-06-24 RX ADMIN — Medication 100 MILLIGRAM(S): at 21:47

## 2019-06-24 RX ADMIN — SODIUM CHLORIDE 1000 MILLILITER(S): 9 INJECTION INTRAMUSCULAR; INTRAVENOUS; SUBCUTANEOUS at 16:55

## 2019-06-24 RX ADMIN — Medication 1000 MILLIGRAM(S): at 13:00

## 2019-06-24 RX ADMIN — SODIUM CHLORIDE 100 MILLILITER(S): 9 INJECTION INTRAMUSCULAR; INTRAVENOUS; SUBCUTANEOUS at 21:47

## 2019-06-24 RX ADMIN — SODIUM CHLORIDE 1000 MILLILITER(S): 9 INJECTION INTRAMUSCULAR; INTRAVENOUS; SUBCUTANEOUS at 16:08

## 2019-06-24 RX ADMIN — CEFTRIAXONE 1000 MILLIGRAM(S): 500 INJECTION, POWDER, FOR SOLUTION INTRAMUSCULAR; INTRAVENOUS at 16:11

## 2019-06-24 RX ADMIN — ATORVASTATIN CALCIUM 10 MILLIGRAM(S): 80 TABLET, FILM COATED ORAL at 21:47

## 2019-06-24 NOTE — ED ADULT NURSE NOTE - NSIMPLEMENTINTERV_GEN_ALL_ED
Implemented All Fall with Harm Risk Interventions:  [place] to call system. Call bell, personal items and telephone within reach. Instruct patient to call for assistance. Room bathroom lighting operational. Non-slip footwear when patient is off stretcher. Physically safe environment: no spills, clutter or unnecessary equipment. Stretcher in lowest position, wheels locked, appropriate side rails in place. Provide visual cue, wrist band, yellow gown, etc. Monitor gait and stability. Monitor for mental status changes and reorient to person, place, and time. Review medications for side effects contributing to fall risk. Reinforce activity limits and safety measures with patient and family. Provide visual clues: red socks.

## 2019-06-24 NOTE — CONSULT NOTE ADULT - ASSESSMENT
87 yo woman admitted post unwitnessed fall at home found to have CARLOS and sever Hypercalcemia.  --CARLOS : likely due to severe volume depletion due to severe hypercalcemia, diuretics and ARB.    D/c ARB and Diuretics.  --Hypercalcemia of unclear etiology --causing severe volume depletion as well.  Check PTH, Vit D and Spep/KORI.  Due to severity, will start Calcitonin tonight in addition to IVF.  Hold off on Bisphosphonates for now.  Unable to obtain detailed hx  to assess for Milk-Alkali syndrome. 87 yo woman admitted post unwitnessed fall at home found to have CARLOS and sever Hypercalcemia.  --CARLOS : likely due to severe volume depletion due to severe hypercalcemia, diuretics and ARB.    D/c ARB and Diuretics.  --Hypercalcemia of unclear etiology --causing severe volume depletion as well.  Check PTH, Vit D and Spep/KORI.  Due to severity, will start Calcitonin tonight in addition to IVF.  Hold off on Bisphosphonates for now.  Unable to obtain detailed hx  to assess for Milk-Alkali syndrome.  --A FIB : Can continue Eliquis with CARLOS/CKD --Recent study validated efficacy and safety of Eliquis in pts with advanced CKD and ESRD.

## 2019-06-24 NOTE — ED PROVIDER NOTE - CLINICAL SUMMARY MEDICAL DECISION MAKING FREE TEXT BOX
No signs of traumatic injury on imaging studies.  Labs with hemoconcentration, elevated calcium, CARLOS (at least from 2017, last known Cr).  Given IV fluids for dehydration, rocephin to cover for possible UTI (based on UA results).  Admit to medicine service for further evaluation and management.

## 2019-06-24 NOTE — ED ADULT NURSE REASSESSMENT NOTE - NS ED NURSE REASSESS COMMENT FT1
Patient care received from CLOVER LAGUERRE Patient A&Ox2, disoriented to time and situation. Denies pain/discomfort. No s/s of distress present. Oriented as needed. Repositioned for comfort & safety, warm blanket provided. Wait time explained, hospitalist consult pending. Safety & comfort measures in place, will continue to monitor.
Patient refusing CT scan, xray, and labs. Educated patient on importance of scans, MD Chavira made aware and will speak with patient. Will continue to monitor
Per MD gutierrez no cultures needed for IV antibiotic
pt medicated for UTI and given 1L NS, readjusted bed and given pillow, made more comfortable and reoriented to call bell and room. family at bedside. RN will ctm
Assumed care of patient from [facility] KIM RN at 1900. Patient AxOx3, very forgetful, pt unsure why she is here. Assisted patient to standing scale, patient extremely weak upon return to bed pt knees started to give out, pt assisted to side of bed to sit. Pt put back into bed with assistance of other ED staff. Patient in no acute signs of distress. All needs addressed at this time. Safety and comfort maintained. Will continue to monitor.

## 2019-06-24 NOTE — ED PROVIDER NOTE - CARE PLAN
Principal Discharge DX:	Dehydration  Secondary Diagnosis:	Elevated troponin  Secondary Diagnosis:	CARLOS (acute kidney injury)

## 2019-06-24 NOTE — ED PROVIDER NOTE - CHIEF COMPLAINT
The patient is a 86y Female complaining of [FreeTextEntry1] : 23 do with cranky episodes at night, sleepy during the day\par cerumen removal with curette b/l\par discussed things to check when infant is cranky\par try feeding 4 oz\par return with stool diaper to check guiac\par follow up if symptoms continue.

## 2019-06-24 NOTE — H&P ADULT - ASSESSMENT
86 y.o. female with PMH AFib on Eliquis, CHF (unknown EF), HTN, HLD, CKD stage 3, hypothyroidism, osteoporosis presents with s/p unwitnessed fall at home. Pt and  with grandchildren at bedside providing history. Pt's  reports that pt was found on the floor after getting out of the restroom. Pt denies hitting head. She denies fever, chills, n/v, CP, SOB. Per grandchildren at bedside, pt more confused and lethargic than her usual self. Reports she normally conversant and last known normal self was 2 days ago when grandson spoke with her. Now, she is more confused than usual. Reports she would say things and babble. Pt organizes her own medications in pill box. Pt denies taking extra meds.    #CARLOS on CKD stage 3 with hypercalcemia  #lethargy due to toxic encephalopathy likely from hypercalcemia  -pt received 1L NS in ED  -cont iv hydration, monitor for fluid overload due to hx CHF  -check iCa, PTH, vit D level, TSH  -hold lasix, losartan, spironolactone, vit D, calcium  -renal consult    #elevated troponin likely demand ischemia with CARLOS  -trend troponins and EKG  -echo  -cardio consult, Dr Akins    #s/p unwitnessed fall, reported right leg pain  -xrays reports noted  -CT head noted  -prn pain control  -PT eval    #leukocytosis  -repeat UA  -obtain blood culture  -possibly from reactive s/p fall  -hold additional abx for now  -cxr report negative    #CHF  -unknown EF  -I/O, daily weight  -echo  -meds held due to above    #AFib on Eliquis  -CrCl 16, will hold Eliquis and transition to heparin drip  -once renal function improves, can resume Eliquis    #DVT ppx  -on Eliquis -> heparin drip    #advanced care planning  -unable to ask patient at this time due to her encephalopathy  -discussed with  and grandchildren at bedside, who reports that pt's son is HCP and as far as they know, the patient is FULL code  -states that if anything changes, they will inform us  -pt currently is FULL CODE  -time: 16 min    IMPROVE VTE Individual Risk Assessment    RISK                                                                Points    [  ] Previous VTE                                                  3    [  ] Thrombophilia                                               2    [  ] Lower limb paralysis                                      2        (unable to hold up >15 seconds)      [  ] Current Cancer                                              2         (within 6 months)    [ x ] Immobilization > 24 hrs                                1    [  ] ICU/CCU stay > 24 hours                              1    [ x ] Age > 60                                                      1    IMPROVE VTE Score ___2______    IMPROVE Score 0-1: Low Risk, No VTE prophylaxis required for most patients, encourage ambulation.   IMPROVE Score 2-3: At risk, pharmacologic VTE prophylaxis is indicated for most patients (in the absence of a contraindication)  IMPROVE Score > or = 4: High Risk, pharmacologic VTE prophylaxis is indicated for most patients (in the absence of a contraindication) 86 y.o. female with PMH AFib on Eliquis, CHF (unknown EF), HTN, HLD, CKD stage 3, hypothyroidism, osteoporosis presents with s/p unwitnessed fall at home. Pt and  with grandchildren at bedside providing history. Pt's  reports that pt was found on the floor after getting out of the restroom. Pt denies hitting head. She denies fever, chills, n/v, CP, SOB. Per grandchildren at bedside, pt more confused and lethargic than her usual self. Reports she normally conversant and last known normal self was 2 days ago when grandson spoke with her. Now, she is more confused than usual. Reports she would say things and babble. Pt organizes her own medications in pill box. Pt denies taking extra meds.    #CARLOS on CKD stage 3 with hypercalcemia  #lethargy due to toxic encephalopathy likely from hypercalcemia  -pt received 1L NS in ED  -cont iv hydration, monitor for fluid overload due to hx CHF  -check iCa, PTH, vit D level, TSH  -hold lasix, losartan, spironolactone, vit D, calcium  -renal consult  -discussed case with Dr Nance, who will first review the chart    #elevated troponin likely demand ischemia with CARLOS  -trend troponins and EKG  -echo  -cardio consult, Dr Akins    #s/p unwitnessed fall, reported right leg pain  -xrays reports noted  -CT head noted  -prn pain control  -PT eval    #leukocytosis  -repeat UA  -obtain blood culture  -possibly from reactive s/p fall  -hold additional abx for now  -cxr report negative    #CHF  -unknown EF  -I/O, daily weight  -echo  -meds held due to above    #AFib on Eliquis  -CrCl 16, will hold Eliquis and transition to heparin drip  -once renal function improves, can resume back on Eliquis    #DVT ppx  -on Eliquis -> heparin drip    #advanced care planning  -unable to ask patient at this time due to her encephalopathy  -discussed with  and grandchildren at bedside, who reports that pt's son is HCP and as far as they know, the patient is FULL code  -states that if anything changes, they will inform us  -pt currently is FULL CODE  -time: 16 min    IMPROVE VTE Individual Risk Assessment    RISK                                                                Points    [  ] Previous VTE                                                  3    [  ] Thrombophilia                                               2    [  ] Lower limb paralysis                                      2        (unable to hold up >15 seconds)      [  ] Current Cancer                                              2         (within 6 months)    [ x ] Immobilization > 24 hrs                                1    [  ] ICU/CCU stay > 24 hours                              1    [ x ] Age > 60                                                      1    IMPROVE VTE Score ___2______    IMPROVE Score 0-1: Low Risk, No VTE prophylaxis required for most patients, encourage ambulation.   IMPROVE Score 2-3: At risk, pharmacologic VTE prophylaxis is indicated for most patients (in the absence of a contraindication)  IMPROVE Score > or = 4: High Risk, pharmacologic VTE prophylaxis is indicated for most patients (in the absence of a contraindication)

## 2019-06-24 NOTE — ED PROVIDER NOTE - MUSCULOSKELETAL, MLM
Spine appears normal, range of motion is not limited. + TTP neck, abrasion and TTP to occipital, TTP to bilateral hips, left knee and right ankle

## 2019-06-24 NOTE — CONSULT NOTE ADULT - SUBJECTIVE AND OBJECTIVE BOX
Chief complaints.   Brought to ED post unwitnessed fall at home.    HPI:  85 yo woman with PMHX of A FIB, Hx of chronic CHF, HTN, hypothyroidism brought to ED post unwitnessed fall at home.  Per earlier report by pt's grandchildren, pt today noted to be confused.  At baseline, pt is able to care for herself and manage her own medications.  CT head was negative but pt is confused though able to answer simple questions.  Pt is alert and responsive, oriented to person only.  Responded she is in Hospital then proceeded to cry and became agitatedf stating her mom was in the hospital.  Renal evaluation requested due to CARLOS--creat 2.27  and severe Hypercalcemia--calcium 15.5.  Unknown recent renal function but creat 1.38 in 2017.  Pt on Lasix/Spironolactone and Losartan as an outpt.    PMHX and PSHX.  1.A FIB on Eliquis  2,CHF (? Diastolic per previous cardiology note)  3.HTN  4 Unclear CKD (creat 1.38 in 2017)  5.PPM  6.Hypothyroidism      FAMILY HISTORY:  No pertinent family history in first degree relatives    SOCIAL HISTORY :  No current hx of smoking or ETOH.    Allergies :  No Known Allergies    REVIEW OF SYSTEMS:  Unable to obtain.  Pt is confused.    Home Medications:   * Patient Currently Takes Medications as of 2019 16:47 documented in Structured Notes  · 	metoprolol succinate 100 mg oral tablet, extended release: Last Dose Taken:  , 1 tab(s) orally 2 times a day  · 	apixaban 5 mg oral tablet: Last Dose Taken:  , 1 tab(s) orally every 12 hours  · 	spironolactone 25 mg oral tablet: Last Dose Taken:  , 0.5 tab(s) orally 2 times a day  · 	pravastatin 20 mg oral tablet: Last Dose Taken:  , 1 tab(s) orally once a day (in the evening)  · 	losartan 25 mg oral tablet: Last Dose Taken:  , 1 tab(s) orally once a day  · 	clopidogrel 75 mg oral tablet: Last Dose Taken:  , 1 tab(s) orally every 48 hours  · 	fenofibrate 48 mg oral tablet: Last Dose Taken:  , 1 tab(s) orally once a day  · 	Lasix 40 mg oral tablet: Last Dose Taken:  , 1 tab(s) orally 2 times a day  · 	levothyroxine 88 mcg (0.088 mg) oral capsule: Last Dose Taken:  , 1 tab(s) orally once a day except take 1/2 tablet on Sundays  · 	Klor-Con 10 oral tablet, extended release: Last Dose Taken:  , 1 tab(s) orally 2 times a day  · 	cholecalciferol 2000 intl units oral tablet: Last Dose Taken:  , 1 tab(s) orally once a day      MEDICATIONS  (STANDING):  atorvastatin 10 milliGRAM(s) Oral at bedtime  clopidogrel Tablet 75 milliGRAM(s) Oral <User Schedule>  fenofibrate Tablet 48 milliGRAM(s) Oral daily  heparin  Infusion.  Unit(s)/Hr (11 mL/Hr) IV Continuous <Continuous>  levothyroxine 88 MICROGram(s) Oral daily  metoprolol succinate  milliGRAM(s) Oral two times a day  sodium chloride 0.9%. 1000 milliLiter(s) (100 mL/Hr) IV Continuous <Continuous>    MEDICATIONS  (PRN):  acetaminophen   Tablet .. 650 milliGRAM(s) Oral every 6 hours PRN Temp greater or equal to 38C (100.4F), Mild Pain (1 - 3)  docusate sodium 100 milliGRAM(s) Oral three times a day PRN Constipation  heparin  Injectable 4500 Unit(s) IV Push every 6 hours PRN For aPTT less than 40  heparin  Injectable 2000 Unit(s) IV Push every 6 hours PRN For aPTT between 40 - 57  senna 2 Tablet(s) Oral at bedtime PRN Constipation    Vital Signs Last 24 Hrs  T(C): 36.9 (2019 20:51), Max: 36.9 (2019 20:51)  T(F): 98.5 (2019 20:51), Max: 98.5 (2019 20:51)  HR: 70 (2019 20:51) (70 - 77)  BP: 136/53 (2019 20:51) (133/70 - 140/90)  BP(mean): --  RR: 19 (2019 20:51) (15 - 21)  SpO2: 90% (2019 20:51) (90% - 100%)  Daily Height in cm: 165.1 (2019 11:31)    Daily   I&O's Summary      PHYSICAL EXAM:  Alert and responsive.  Oriented to person only.  GEN: No acute distress  HEENT: WNL  NECK : supple   CV: S1S2 RRR  LUNGS: fair air entry  ABD: soft  EXT: no edema    LABS:                        17.9   16.45 )-----------( 299      ( 2019 14:21 )             53.8         140  |  97  |  57<H>  ----------------------------<  117<H>  3.6   |  34<H>  |  2.27<H>    Ca    15.5<HH>      2019 14:21    TPro  7.9  /  Alb  4.1  /  TBili  0.9  /  DBili  x   /  AST  23  /  ALT  20  /  AlkPhos  66      PT/INR - ( 2019 14:21 )   PT: 26.4 sec;   INR: 2.32 ratio         PTT - ( 2019 14:21 )  PTT:36.7 sec  Urinalysis Basic - ( 2019 15:10 )    Color: Yellow / Appearance: very cloudy / S.025 / pH: x  Gluc: x / Ketone: Negative  / Bili: Negative / Urobili: Negative mg/dL   Blood: x / Protein: 30 mg/dL / Nitrite: Negative   Leuk Esterase: Moderate / RBC: 0-2 /HPF / WBC 3-5   Sq Epi: x / Non Sq Epi: Many / Bacteria: TNTC

## 2019-06-24 NOTE — ED PROVIDER NOTE - OBJECTIVE STATEMENT
85 y/o female with a PMHx of Afib on Eliquis, Pacemaker, HTN, HLD presents to the ED s/p witnessed fall today.  at bedside states that the pt was walking out of the bathroom today when she fell. Pt states that she hit her head on the fall. No LOC. Now pt c/o right hip pain, HA and neck pain. Denies chest pain.

## 2019-06-24 NOTE — ED ADULT TRIAGE NOTE - CHIEF COMPLAINT QUOTE
Pt BIBEMS for unwitnessed fall at home in [place], takes daily Plavix and Eliquis, pt denies any injury but is a poor historian and has some dementia at baseline. Pt called her sister after falling, sister called her  who is in house with her and  called EMS. Trauma alert initiated on arrival.

## 2019-06-24 NOTE — INPATIENT CERTIFICATION FOR MEDICARE PATIENTS - RISKS OF ADVERSE EVENTS
Concern for renal deterioration/Concern for delay in diagnosis and treatment/Concern for cardiopulmonary deterioration

## 2019-06-24 NOTE — H&P ADULT - NSHPPHYSICALEXAM_GEN_ALL_CORE
Vital Signs Last 24 Hrs  T(C): 36.4 (24 Jun 2019 15:08), Max: 36.4 (24 Jun 2019 15:08)  T(F): 97.6 (24 Jun 2019 15:08), Max: 97.6 (24 Jun 2019 15:08)  HR: 77 (24 Jun 2019 15:08) (73 - 77)  BP: 140/90 (24 Jun 2019 15:08) (133/70 - 140/90)  BP(mean): --  RR: 15 (24 Jun 2019 15:08) (15 - 16)  SpO2: 100% (24 Jun 2019 15:08) (92% - 100%)    GEN: appears comfortable  Neuro: Alert, conversant, CN nonfocal  HEENT: NC/AT, EOMI  Neck: no thyroidmegaly, no JVD  Cardiovascular: S1S2 present, irregular rhythm, no murmur  Respiratory: breath sounds normal bilaterally, no wheezing, no rales, no rhonchi  Gastrointestinal: bowel sounds normal, soft, no abdominal tenderness  Musculoskeletal: no muscle tenderness  Extremities: b/l edema  Skin: No rash Vital Signs Last 24 Hrs  T(C): 36.4 (24 Jun 2019 15:08), Max: 36.4 (24 Jun 2019 15:08)  T(F): 97.6 (24 Jun 2019 15:08), Max: 97.6 (24 Jun 2019 15:08)  HR: 77 (24 Jun 2019 15:08) (73 - 77)  BP: 140/90 (24 Jun 2019 15:08) (133/70 - 140/90)  BP(mean): --  RR: 15 (24 Jun 2019 15:08) (15 - 16)  SpO2: 100% (24 Jun 2019 15:08) (92% - 100%)    GEN: appears comfortable  Neuro: Alert, conversant, CN nonfocal  HEENT: NC/AT, EOMI  Neck: no thyroidmegaly, no JVD  Cardiovascular: S1S2 present, irregular rhythm, no murmur  Respiratory: breath sounds normal bilaterally, no wheezing, no rales, no rhonchi  Gastrointestinal: bowel sounds normal, soft, no abdominal tenderness  Musculoskeletal: no muscle tenderness  Extremities: b/l edema, fingers bluish hue  Skin: No rash

## 2019-06-24 NOTE — H&P ADULT - HISTORY OF PRESENT ILLNESS
86 y.o. female with PMH AFib on Eliquis, CHF (unknown EF), HTN, HLD, CKD stage 3, hypothyroidism, osteoporosis presents with s/p unwitnessed fall at home. Pt and  with grandchildren at bedside providing history. Pt's  reports that pt was found on the floor after getting out of the restroom. Pt denies hitting head. She denies fever, chills, n/v, CP, SOB. Per grandchildren at bedside, pt more confused and lethargic than her usual self. Reports she normally conversant and last known normal self was 2 days ago when grandson spoke with her. Now, she is more confused than usual. Reports she would say things and babble. Pt organizes her own medications in pill box. Pt denies taking extra meds.    PMH: as above  PSH: cataract, PPM, hysterectomy  Social Hx: denies tobacco, EtOH, drugs  Family Hx: pt poor historian due to medical condition  ROS: pt poor historian due to medical condition

## 2019-06-25 LAB
24R-OH-CALCIDIOL SERPL-MCNC: 65.7 NG/ML — SIGNIFICANT CHANGE UP (ref 30–80)
AMMONIA BLD-MCNC: 27 UMOL/L — SIGNIFICANT CHANGE UP (ref 11–32)
ANION GAP SERPL CALC-SCNC: 7 MMOL/L — SIGNIFICANT CHANGE UP (ref 5–17)
APPEARANCE UR: ABNORMAL
APPEARANCE UR: CLEAR — SIGNIFICANT CHANGE UP
BACTERIA # UR AUTO: ABNORMAL
BACTERIA # UR AUTO: ABNORMAL
BASOPHILS # BLD AUTO: 0.04 K/UL — SIGNIFICANT CHANGE UP (ref 0–0.2)
BASOPHILS NFR BLD AUTO: 0.4 % — SIGNIFICANT CHANGE UP (ref 0–2)
BILIRUB UR-MCNC: NEGATIVE — SIGNIFICANT CHANGE UP
BILIRUB UR-MCNC: NEGATIVE — SIGNIFICANT CHANGE UP
BUN SERPL-MCNC: 47 MG/DL — HIGH (ref 7–23)
CA-I BLD-SCNC: 1.92 MMOL/L — CRITICAL HIGH (ref 1.12–1.3)
CALCIUM SERPL-MCNC: 12.2 MG/DL — HIGH (ref 8.5–10.1)
CALCIUM SERPL-MCNC: 14.3 MG/DL — CRITICAL HIGH (ref 8.4–10.5)
CHLORIDE SERPL-SCNC: 104 MMOL/L — SIGNIFICANT CHANGE UP (ref 96–108)
CHOLEST SERPL-MCNC: 115 MG/DL — SIGNIFICANT CHANGE UP (ref 10–199)
CO2 SERPL-SCNC: 30 MMOL/L — SIGNIFICANT CHANGE UP (ref 22–31)
COLOR SPEC: YELLOW — SIGNIFICANT CHANGE UP
COLOR SPEC: YELLOW — SIGNIFICANT CHANGE UP
COMMENT - URINE: SIGNIFICANT CHANGE UP
CREAT SERPL-MCNC: 1.43 MG/DL — HIGH (ref 0.5–1.3)
DIFF PNL FLD: ABNORMAL
DIFF PNL FLD: ABNORMAL
EOSINOPHIL # BLD AUTO: 0.07 K/UL — SIGNIFICANT CHANGE UP (ref 0–0.5)
EOSINOPHIL NFR BLD AUTO: 0.6 % — SIGNIFICANT CHANGE UP (ref 0–6)
EPI CELLS # UR: ABNORMAL
EPI CELLS # UR: ABNORMAL
GLUCOSE SERPL-MCNC: 125 MG/DL — HIGH (ref 70–99)
GLUCOSE UR QL: NEGATIVE MG/DL — SIGNIFICANT CHANGE UP
GLUCOSE UR QL: NEGATIVE MG/DL — SIGNIFICANT CHANGE UP
HCT VFR BLD CALC: 48.9 % — HIGH (ref 34.5–45)
HDLC SERPL-MCNC: 27 MG/DL — LOW
HGB BLD-MCNC: 16.3 G/DL — HIGH (ref 11.5–15.5)
HYALINE CASTS # UR AUTO: ABNORMAL /LPF
IMM GRANULOCYTES NFR BLD AUTO: 0.5 % — SIGNIFICANT CHANGE UP (ref 0–1.5)
KETONES UR-MCNC: NEGATIVE — SIGNIFICANT CHANGE UP
KETONES UR-MCNC: NEGATIVE — SIGNIFICANT CHANGE UP
LEUKOCYTE ESTERASE UR-ACNC: ABNORMAL
LEUKOCYTE ESTERASE UR-ACNC: ABNORMAL
LIPID PNL WITH DIRECT LDL SERPL: 42 MG/DL — SIGNIFICANT CHANGE UP
LYMPHOCYTES # BLD AUTO: 1.72 K/UL — SIGNIFICANT CHANGE UP (ref 1–3.3)
LYMPHOCYTES # BLD AUTO: 15.2 % — SIGNIFICANT CHANGE UP (ref 13–44)
MAGNESIUM SERPL-MCNC: 2.3 MG/DL — SIGNIFICANT CHANGE UP (ref 1.6–2.6)
MCHC RBC-ENTMCNC: 31 PG — SIGNIFICANT CHANGE UP (ref 27–34)
MCHC RBC-ENTMCNC: 33.3 GM/DL — SIGNIFICANT CHANGE UP (ref 32–36)
MCV RBC AUTO: 93.1 FL — SIGNIFICANT CHANGE UP (ref 80–100)
MONOCYTES # BLD AUTO: 0.79 K/UL — SIGNIFICANT CHANGE UP (ref 0–0.9)
MONOCYTES NFR BLD AUTO: 7 % — SIGNIFICANT CHANGE UP (ref 2–14)
NEUTROPHILS # BLD AUTO: 8.62 K/UL — HIGH (ref 1.8–7.4)
NEUTROPHILS NFR BLD AUTO: 76.3 % — SIGNIFICANT CHANGE UP (ref 43–77)
NITRITE UR-MCNC: NEGATIVE — SIGNIFICANT CHANGE UP
NITRITE UR-MCNC: NEGATIVE — SIGNIFICANT CHANGE UP
PH UR: 5 — SIGNIFICANT CHANGE UP (ref 5–8)
PH UR: 6 — SIGNIFICANT CHANGE UP (ref 5–8)
PHOSPHATE SERPL-MCNC: 2.7 MG/DL — SIGNIFICANT CHANGE UP (ref 2.5–4.5)
PLATELET # BLD AUTO: 299 K/UL — SIGNIFICANT CHANGE UP (ref 150–400)
POTASSIUM SERPL-MCNC: 3.1 MMOL/L — LOW (ref 3.5–5.3)
POTASSIUM SERPL-SCNC: 3.1 MMOL/L — LOW (ref 3.5–5.3)
PROT SERPL-MCNC: 6.4 G/DL — SIGNIFICANT CHANGE UP (ref 6–8.3)
PROT SERPL-MCNC: 6.4 G/DL — SIGNIFICANT CHANGE UP (ref 6–8.3)
PROT UR-MCNC: 30 MG/DL
PROT UR-MCNC: 30 MG/DL
PTH-INTACT FLD-MCNC: 7 PG/ML — LOW (ref 15–65)
RBC # BLD: 5.25 M/UL — HIGH (ref 3.8–5.2)
RBC # FLD: 14.9 % — HIGH (ref 10.3–14.5)
RBC CASTS # UR COMP ASSIST: >50 /HPF (ref 0–4)
RBC CASTS # UR COMP ASSIST: >50 /HPF (ref 0–4)
SODIUM SERPL-SCNC: 141 MMOL/L — SIGNIFICANT CHANGE UP (ref 135–145)
SP GR SPEC: 1.02 — SIGNIFICANT CHANGE UP (ref 1.01–1.02)
SP GR SPEC: 1.02 — SIGNIFICANT CHANGE UP (ref 1.01–1.02)
TOTAL CHOLESTEROL/HDL RATIO MEASUREMENT: 4.3 RATIO — SIGNIFICANT CHANGE UP (ref 3.3–7.1)
TRIGL SERPL-MCNC: 229 MG/DL — HIGH (ref 10–149)
TROPONIN I SERPL-MCNC: 0.09 NG/ML — HIGH (ref 0.01–0.04)
UROBILINOGEN FLD QL: NEGATIVE MG/DL — SIGNIFICANT CHANGE UP
UROBILINOGEN FLD QL: NEGATIVE MG/DL — SIGNIFICANT CHANGE UP
VIT D25+D1,25 OH+D1,25 PNL SERPL-MCNC: 27.5 PG/ML — SIGNIFICANT CHANGE UP (ref 19.9–79.3)
WBC # BLD: 11.3 K/UL — HIGH (ref 3.8–10.5)
WBC # FLD AUTO: 11.3 K/UL — HIGH (ref 3.8–10.5)
WBC UR QL: SIGNIFICANT CHANGE UP
WBC UR QL: SIGNIFICANT CHANGE UP

## 2019-06-25 PROCEDURE — 93306 TTE W/DOPPLER COMPLETE: CPT | Mod: 26

## 2019-06-25 RX ORDER — APIXABAN 2.5 MG/1
5 TABLET, FILM COATED ORAL
Refills: 0 | Status: DISCONTINUED | OUTPATIENT
Start: 2019-06-25 | End: 2019-07-01

## 2019-06-25 RX ORDER — POTASSIUM CHLORIDE 20 MEQ
40 PACKET (EA) ORAL ONCE
Refills: 0 | Status: COMPLETED | OUTPATIENT
Start: 2019-06-25 | End: 2019-06-25

## 2019-06-25 RX ORDER — DEXTROSE MONOHYDRATE, SODIUM CHLORIDE, AND POTASSIUM CHLORIDE 50; .745; 4.5 G/1000ML; G/1000ML; G/1000ML
1000 INJECTION, SOLUTION INTRAVENOUS
Refills: 0 | Status: DISCONTINUED | OUTPATIENT
Start: 2019-06-25 | End: 2019-06-26

## 2019-06-25 RX ADMIN — CALCITONIN SALMON 240 INTERNATIONAL UNIT(S): 200 INJECTION, SOLUTION INTRAMUSCULAR at 11:23

## 2019-06-25 RX ADMIN — DEXTROSE MONOHYDRATE, SODIUM CHLORIDE, AND POTASSIUM CHLORIDE 100 MILLILITER(S): 50; .745; 4.5 INJECTION, SOLUTION INTRAVENOUS at 16:59

## 2019-06-25 RX ADMIN — Medication 100 MILLIGRAM(S): at 06:28

## 2019-06-25 RX ADMIN — APIXABAN 5 MILLIGRAM(S): 2.5 TABLET, FILM COATED ORAL at 06:28

## 2019-06-25 RX ADMIN — CALCITONIN SALMON 240 INTERNATIONAL UNIT(S): 200 INJECTION, SOLUTION INTRAMUSCULAR at 00:26

## 2019-06-25 RX ADMIN — Medication 88 MICROGRAM(S): at 06:28

## 2019-06-25 RX ADMIN — Medication 40 MILLIEQUIVALENT(S): at 17:00

## 2019-06-25 RX ADMIN — Medication 48 MILLIGRAM(S): at 11:23

## 2019-06-25 RX ADMIN — SODIUM CHLORIDE 100 MILLILITER(S): 9 INJECTION INTRAMUSCULAR; INTRAVENOUS; SUBCUTANEOUS at 06:43

## 2019-06-25 RX ADMIN — Medication 100 MILLIGRAM(S): at 17:00

## 2019-06-25 RX ADMIN — ATORVASTATIN CALCIUM 10 MILLIGRAM(S): 80 TABLET, FILM COATED ORAL at 21:13

## 2019-06-25 RX ADMIN — APIXABAN 5 MILLIGRAM(S): 2.5 TABLET, FILM COATED ORAL at 17:00

## 2019-06-25 RX ADMIN — CLOPIDOGREL BISULFATE 75 MILLIGRAM(S): 75 TABLET, FILM COATED ORAL at 11:23

## 2019-06-25 RX ADMIN — CALCITONIN SALMON 240 INTERNATIONAL UNIT(S): 200 INJECTION, SOLUTION INTRAMUSCULAR at 22:06

## 2019-06-25 NOTE — PHYSICAL THERAPY INITIAL EVALUATION ADULT - DISCHARGE DISPOSITION, PT EVAL
Bedside shift change report given to Radha Moses RN (oncoming nurse) by Sadaf Lux RN (offgoing nurse). Report included the following information SBAR, ED Summary, MAR and Recent Results. rehabilitation facility

## 2019-06-25 NOTE — CHART NOTE - NSCHARTNOTEFT_GEN_A_CORE
RN notified with a critical value. Ionized calcium of 1.92.   Pt received dose of calcitonin few hours ago.

## 2019-06-25 NOTE — PROGRESS NOTE ADULT - ASSESSMENT
87 yo woman admitted post unwitnessed fall at home found to have CARLOS and sever Hypercalcemia.  --CARLOS : likely due to severe volume depletion due to severe hypercalcemia, diuretics and ARB.    D/c ARB and Diuretics.  --Hypercalcemia of unclear etiology --causing severe volume depletion as well.  Check PTH, Vit D and Spep/KORI.  Due to severity, will start Calcitonin tonight in addition to IVF.  Hold off on Bisphosphonates for now.  Unable to obtain detailed hx  to assess for Milk-Alkali syndrome.  --A FIB : Can continue Eliquis with CARLOS/CKD --Recent study validated efficacy and safety of Eliquis in pts with advanced CKD and ESRD.    6/25 SY  --CARLOS/Likely a degree of CKD due to severe volume depletion and Hypercalcemia : Continue gentle hydration.  --Severe Hypercalcemia : PTH appropriately suppressed.   Follow up SPEP /KORI.  PTH rp.  Continue IVF and Calcitonin.  --Replete K.

## 2019-06-25 NOTE — CHART NOTE - NSCHARTNOTEFT_GEN_A_CORE
Notified by RN, Pt with reported calcium of 14.3. Pt was admitted overnight with Ca of 15.5. Pt is on IVF and receive calcitonin x1 earlier this morning. Pt is due for 3 more doses.  Will continue to monitor pt.   Will notify primary team.

## 2019-06-25 NOTE — PHYSICAL THERAPY INITIAL EVALUATION ADULT - PERTINENT HX OF CURRENT PROBLEM, REHAB EVAL
87 yo woman with PMHX of A FIB, Hx of chronic CHF, HTN, hypothyroidism brought to ED post unwitnessed fall at home.  Per earlier report by pt's grandchildren, pt today noted to be confused.  At baseline, pt is able to care for herself and manage her own medications.

## 2019-06-25 NOTE — CONSULT NOTE ADULT - SUBJECTIVE AND OBJECTIVE BOX
Patient is a 86y old  Female who presents with a chief complaint of s/p fall     ________________________________  NJigar NOEL is a 86y year old Female with a past medical history of chronic atrial fibrillation previously on warfarin, but switched to a direct oral anticoagulant with Eliquis due to GI bleed, history of chronic diastolic heart failure, history of sick sinus syndrome status post [employer]  pacemaker implantation, hypertension, hyperlipidemia, hypothyroidism, osteoporosis and chronic kidney disease.     The patient now presents for weakness, and a fall that occurred at home. The fall was unwitnessed. Patient is confused, and could not provide an adequate history. She was noted to have acute kidney insufficiency and hypercalcemia.    Cardiac enzymes were mildly elevated.      [employer] Onanticoagulation with Eliquis due to his  sick sinus syndrome status post pacem hypertension, hyperlipidemia with  osteoporosis and chronic diastolic heart failure.    Echocardiogram from April of this year showed Preserved LVEF with moderate mitral valve regurgitation and severe left atrial dilatation  She has had a echocardiogram in  which showed no significant CAD. She has not undergone any recent stress test.    Echo 2019  --There is severe left atrial dilatation (LA volume index 61 ml/m²).  --There is moderate left ventricular hypertrophy.  --LV global wall motion is normal.  --LV ejection fraction (64 %) is normal.  --Left ventricular diastolic dysfunction with elevated left atrial pressure.  --The left ventricular ejection fraction is normal at 60-65%.  --There is right atrial dilatation.  --There is a pacemaker in the right heart.  --There is mild aortic valve thickening.  --There is a mild obstructive flow pattern in LVOT. At rest, the left ventricular outflow   gradient is 3 mmHg. With the Valsalva maneuver, the left ventricular outflow gradient is 4   mmHg.  --There is mitral annular calcification. There is mild to moderate mitral regurgitation.  --There is mild to moderate tricuspid regurgitation.  --There is mild to moderate pulmonic regurgitation.  --The right atrial pressure is 6 - 10 mm Hg. There is mild pulmonary hypertension.    _______________________________  Review of systems: A 10 point review of system has been performed, and is negative except for what has been mentioned in the above history of present illness.     PAST MEDICAL & SURGICAL HISTORY:  Chronic atrial fibrillation on anticoagulation warfarin  Carotid atherosclerosis  Hypertension  Hyperlipidemia  Chronic diastolic heart failure  Sick sinus syndrome Status post [employer] pacemaker  Coronary angiogram, pacemaker placement, gastric ulcer surgery, colonoscopy, EGD    FAMILY HISTORY:  Family history significant heart failure    SOCIAL HISTORY: No history of tobacco abuse per medical records.     Home Medications:  apixaban 5 mg oral tablet: 1 tab(s) orally every 12 hours (2019 16:47)  cholecalciferol 2000 intl units oral tablet: 1 tab(s) orally once a day (2019 16:47)  clopidogrel 75 mg oral tablet: 1 tab(s) orally every 48 hours (2019 16:47)  fenofibrate 48 mg oral tablet: 1 tab(s) orally once a day (2019 16:47)  Klor-Con 10 oral tablet, extended release: 1 tab(s) orally 2 times a day (2019 16:47)  Lasix 40 mg oral tablet: 1 tab(s) orally 2 times a day (2019 16:47)  levothyroxine 88 mcg (0.088 mg) oral capsule: 1 tab(s) orally once a day except take 1/2 tablet on Sundays (2019 16:47)  losartan 25 mg oral tablet: 1 tab(s) orally once a day (2019 16:47)  metoprolol succinate 100 mg oral tablet, extended release: 1 tab(s) orally 2 times a day (2019 16:47)  pravastatin 20 mg oral tablet: 1 tab(s) orally once a day (in the evening) (2019 16:47)  spironolactone 25 mg oral tablet: 0.5 tab(s) orally 2 times a day (2019 16:47)    MEDICATIONS  (STANDING):  apixaban 5 milliGRAM(s) Oral two times a day  atorvastatin 10 milliGRAM(s) Oral at bedtime  calcitonin Injectable 240 International Unit(s) SubCutaneous every 12 hours  clopidogrel Tablet 75 milliGRAM(s) Oral <User Schedule>  fenofibrate Tablet 48 milliGRAM(s) Oral daily  levothyroxine 88 MICROGram(s) Oral daily  metoprolol succinate  milliGRAM(s) Oral two times a day  potassium chloride    Tablet ER 40 milliEquivalent(s) Oral once  sodium chloride 0.9%. 1000 milliLiter(s) (100 mL/Hr) IV Continuous <Continuous>    MEDICATIONS  (PRN):  acetaminophen   Tablet .. 650 milliGRAM(s) Oral every 6 hours PRN Temp greater or equal to 38C (100.4F), Mild Pain (1 - 3)  docusate sodium 100 milliGRAM(s) Oral three times a day PRN Constipation  senna 2 Tablet(s) Oral at bedtime PRN Constipation    Vital Signs Last 24 Hrs  T(C): 36.3 (2019 11:29), Max: 36.9 (2019 20:51)  T(F): 97.3 (2019 11:29), Max: 98.5 (2019 20:51)  HR: 70 (2019 11:29) (70 - 77)  BP: 125/58 (2019 11:29) (125/58 - 140/90)  BP(mean): --  RR: 16 (2019 11:29) (15 - 21)  SpO2: 93% (2019 11:29) (90% - 100%)  I&O's Summary    ________________________________  PHYSICAL EXAM:  GENERAL APPEARANCE:  No acute distress  HEAD: normocephalic, atraumatic  NECK: supple, no jugular venous distention, no carotid bruit    HEART: regular rate and rhythm, S1, S2 normal, 2/6 systolic murmur    CHEST: Left-sided pacemaker    LUNGS: Course    ABDOMEN soft, nontender, nondistended, with positive bowel sounds appreciated  EXTREMITIES: no clubbing, cyanosis, with minimal lower extremity edema  NEURO: Awake, answered questions with diffuse  PSYC:  Normal affect  SKIN:  Dry   ________________________________  TELEMETRY: Patient refusing telemetry    ECG: Per my interpretation, atrial fibrillation, with demand ventricular pacing at 73 bpm    LABS:                        16.3   11.30 )-----------( 299      ( 2019 07:03 )             48.9             06-25    141  |  104  |  47<H>  ----------------------------<  125<H>  3.1<L>   |  30  |  1.43<H>    Ca    12.2<H>      2019 07:03  Phos  2.7       Mg     2.3         TPro  7.9  /  Alb  4.1  /  TBili  0.9  /  DBili  x   /  AST  23  /  ALT  20  /  AlkPhos  66        LIVER FUNCTIONS - ( 2019 14:21 )  Alb: 4.1 g/dL / Pro: 7.9 gm/dL / ALK PHOS: 66 U/L / ALT: 20 U/L / AST: 23 U/L / GGT: x         PT/INR - ( 2019 14:21 )   PT: 26.4 sec;   INR: 2.32 ratio         PTT - ( 2019 14:21 )  PTT:36.7 sec     @ 23:28  Trop-I  0.091  CK      --  CK-MB   --     @ 19:46  Trop-I  0.089  CK      --  CK-MB   --     @ 14:21  Trop-I  0.073  CK      --  CK-MB   --  Urinalysis Basic - ( 2019 15:10 )    Color: Yellow / Appearance: very cloudy / S.025 / pH: x  Gluc: x / Ketone: Negative  / Bili: Negative / Urobili: Negative mg/dL   Blood: x / Protein: 30 mg/dL / Nitrite: Negative   Leuk Esterase: Moderate / RBC: 0-2 /HPF / WBC 3-5   Sq Epi: x / Non Sq Epi: Many / Bacteria: TNTC      Pro BNP  8265  @ 14:21  D Dimer  --  @ 14:21    PT/INR - ( 2019 14:21 )   PT: 26.4 sec;   INR: 2.32 ratio         PTT - ( 2019 14:21 )  PTT:36.7 sec  Urinalysis Basic - ( 2019 15:10 )    Color: Yellow / Appearance: very cloudy / S.025 / pH: x  Gluc: x / Ketone: Negative  / Bili: Negative / Urobili: Negative mg/dL   Blood: x / Protein: 30 mg/dL / Nitrite: Negative   Leuk Esterase: Moderate / RBC: 0-2 /HPF / WBC 3-5   Sq Epi: x / Non Sq Epi: Many / Bacteria: TNTC    ________________________________    RADIOLOGY & ADDITIONAL STUDIES:  ________________________________    ASSESSMENT: This patient presents status post fall, was noted to have elevated cardiac enzymes, hypercalcemia and acute renal insufficiency due to volume depletion      Elevated cardiac enzymes, likely demand ischemia  Chronic atrial fibrillation on anticoagulation with Eliquis  Chronic diastolic heart failure-compensated  Carotid artery atherosclerosis  Hypertension and hyperlipidemia  Acute on chronic renal insufficiency  SSS s/p dual chamber PPM w/ a nelli sci device    PLAN:    Elevated cardiac enzymes likely demand ischemia setting of renal insufficiency and hypercalcemia. Hold off on ischemic evaluation at this time.    Beta blocker, statin, antiplatelet for cardio protective effect.  She does have carotid atherosclerosis, however her risk of bleeding increases with Eliquis and Plavix. We'll discontinue Plavix and start low-dose aspirin as an alternative.    She appears compensated from a heart failure standpoint. Agree with continuation of IV hydration. Nephrology following for hypercalcemia and renal insufficiency.    Supplement electrolytes.  Continue with statin.  Continue a beta blocker.    __________________________________________________________________________  Thank you for allowing me to participate in the care of your patient. Please contact me should any questions arise.    BONY Ridley, , [facility]  [phone]

## 2019-06-26 LAB
% ALBUMIN: 57.4 % — SIGNIFICANT CHANGE UP
% ALPHA 1: 4.5 % — SIGNIFICANT CHANGE UP
% ALPHA 2: 8.8 % — SIGNIFICANT CHANGE UP
% BETA: 13.6 % — SIGNIFICANT CHANGE UP
% GAMMA: 15.7 % — SIGNIFICANT CHANGE UP
ADD ON TEST-SPECIMEN IN LAB: SIGNIFICANT CHANGE UP
ALBUMIN SERPL ELPH-MCNC: 3.3 G/DL — SIGNIFICANT CHANGE UP (ref 3.3–5)
ALBUMIN SERPL ELPH-MCNC: 3.7 G/DL — SIGNIFICANT CHANGE UP (ref 3.6–5.5)
ALBUMIN/GLOB SERPL ELPH: 1.4 RATIO — SIGNIFICANT CHANGE UP
ALP SERPL-CCNC: 53 U/L — SIGNIFICANT CHANGE UP (ref 40–120)
ALPHA1 GLOB SERPL ELPH-MCNC: 0.3 G/DL — SIGNIFICANT CHANGE UP (ref 0.1–0.4)
ALPHA2 GLOB SERPL ELPH-MCNC: 0.6 G/DL — SIGNIFICANT CHANGE UP (ref 0.5–1)
ALT FLD-CCNC: 17 U/L — SIGNIFICANT CHANGE UP (ref 12–78)
ANION GAP SERPL CALC-SCNC: 6 MMOL/L — SIGNIFICANT CHANGE UP (ref 5–17)
AST SERPL-CCNC: 22 U/L — SIGNIFICANT CHANGE UP (ref 15–37)
B-GLOBULIN SERPL ELPH-MCNC: 0.9 G/DL — SIGNIFICANT CHANGE UP (ref 0.5–1)
BILIRUB SERPL-MCNC: 0.7 MG/DL — SIGNIFICANT CHANGE UP (ref 0.2–1.2)
BUN SERPL-MCNC: 32 MG/DL — HIGH (ref 7–23)
CALCIUM SERPL-MCNC: 11.3 MG/DL — HIGH (ref 8.5–10.1)
CHLORIDE SERPL-SCNC: 110 MMOL/L — HIGH (ref 96–108)
CO2 SERPL-SCNC: 30 MMOL/L — SIGNIFICANT CHANGE UP (ref 22–31)
CREAT SERPL-MCNC: 1.12 MG/DL — SIGNIFICANT CHANGE UP (ref 0.5–1.3)
CULTURE RESULTS: SIGNIFICANT CHANGE UP
GAMMA GLOBULIN: 1 G/DL — SIGNIFICANT CHANGE UP (ref 0.6–1.6)
GLUCOSE SERPL-MCNC: 118 MG/DL — HIGH (ref 70–99)
HCT VFR BLD CALC: 48.4 % — HIGH (ref 34.5–45)
HGB BLD-MCNC: 15.9 G/DL — HIGH (ref 11.5–15.5)
INTERPRETATION SERPL IFE-IMP: SIGNIFICANT CHANGE UP
MCHC RBC-ENTMCNC: 31 PG — SIGNIFICANT CHANGE UP (ref 27–34)
MCHC RBC-ENTMCNC: 32.9 GM/DL — SIGNIFICANT CHANGE UP (ref 32–36)
MCV RBC AUTO: 94.3 FL — SIGNIFICANT CHANGE UP (ref 80–100)
OB PNL STL: NEGATIVE — SIGNIFICANT CHANGE UP
PHOSPHATE SERPL-MCNC: 1.6 MG/DL — LOW (ref 2.5–4.5)
PLATELET # BLD AUTO: 254 K/UL — SIGNIFICANT CHANGE UP (ref 150–400)
POTASSIUM SERPL-MCNC: 3.7 MMOL/L — SIGNIFICANT CHANGE UP (ref 3.5–5.3)
POTASSIUM SERPL-SCNC: 3.7 MMOL/L — SIGNIFICANT CHANGE UP (ref 3.5–5.3)
PROT PATTERN SERPL ELPH-IMP: SIGNIFICANT CHANGE UP
PROT SERPL-MCNC: 6.8 GM/DL — SIGNIFICANT CHANGE UP (ref 6–8.3)
RBC # BLD: 5.13 M/UL — SIGNIFICANT CHANGE UP (ref 3.8–5.2)
RBC # FLD: 15.2 % — HIGH (ref 10.3–14.5)
SODIUM SERPL-SCNC: 146 MMOL/L — HIGH (ref 135–145)
SPECIMEN SOURCE: SIGNIFICANT CHANGE UP
WBC # BLD: 11.32 K/UL — HIGH (ref 3.8–10.5)
WBC # FLD AUTO: 11.32 K/UL — HIGH (ref 3.8–10.5)

## 2019-06-26 PROCEDURE — 74176 CT ABD & PELVIS W/O CONTRAST: CPT | Mod: 26

## 2019-06-26 RX ORDER — ASPIRIN/CALCIUM CARB/MAGNESIUM 324 MG
81 TABLET ORAL DAILY
Refills: 0 | Status: DISCONTINUED | OUTPATIENT
Start: 2019-06-26 | End: 2019-07-01

## 2019-06-26 RX ORDER — DEXTROSE MONOHYDRATE, SODIUM CHLORIDE, AND POTASSIUM CHLORIDE 50; .745; 4.5 G/1000ML; G/1000ML; G/1000ML
1000 INJECTION, SOLUTION INTRAVENOUS
Refills: 0 | Status: DISCONTINUED | OUTPATIENT
Start: 2019-06-26 | End: 2019-06-27

## 2019-06-26 RX ADMIN — Medication 48 MILLIGRAM(S): at 11:52

## 2019-06-26 RX ADMIN — DEXTROSE MONOHYDRATE, SODIUM CHLORIDE, AND POTASSIUM CHLORIDE 100 MILLILITER(S): 50; .745; 4.5 INJECTION, SOLUTION INTRAVENOUS at 11:52

## 2019-06-26 RX ADMIN — Medication 100 MILLIGRAM(S): at 22:00

## 2019-06-26 RX ADMIN — SENNA PLUS 2 TABLET(S): 8.6 TABLET ORAL at 22:00

## 2019-06-26 RX ADMIN — Medication 100 MILLIGRAM(S): at 06:14

## 2019-06-26 RX ADMIN — DEXTROSE MONOHYDRATE, SODIUM CHLORIDE, AND POTASSIUM CHLORIDE 100 MILLILITER(S): 50; .745; 4.5 INJECTION, SOLUTION INTRAVENOUS at 23:40

## 2019-06-26 RX ADMIN — APIXABAN 5 MILLIGRAM(S): 2.5 TABLET, FILM COATED ORAL at 06:14

## 2019-06-26 RX ADMIN — CALCITONIN SALMON 240 INTERNATIONAL UNIT(S): 200 INJECTION, SOLUTION INTRAMUSCULAR at 14:15

## 2019-06-26 RX ADMIN — Medication 88 MICROGRAM(S): at 06:14

## 2019-06-26 RX ADMIN — APIXABAN 5 MILLIGRAM(S): 2.5 TABLET, FILM COATED ORAL at 21:59

## 2019-06-26 RX ADMIN — Medication 81 MILLIGRAM(S): at 11:52

## 2019-06-26 RX ADMIN — ATORVASTATIN CALCIUM 10 MILLIGRAM(S): 80 TABLET, FILM COATED ORAL at 22:00

## 2019-06-27 LAB
ALBUMIN SERPL ELPH-MCNC: 3.1 G/DL — LOW (ref 3.3–5)
ANION GAP SERPL CALC-SCNC: 8 MMOL/L — SIGNIFICANT CHANGE UP (ref 5–17)
BASOPHILS # BLD AUTO: 0.05 K/UL — SIGNIFICANT CHANGE UP (ref 0–0.2)
BASOPHILS NFR BLD AUTO: 0.6 % — SIGNIFICANT CHANGE UP (ref 0–2)
BUN SERPL-MCNC: 29 MG/DL — HIGH (ref 7–23)
CALCIUM SERPL-MCNC: 10.2 MG/DL — HIGH (ref 8.5–10.1)
CHLORIDE SERPL-SCNC: 112 MMOL/L — HIGH (ref 96–108)
CO2 SERPL-SCNC: 27 MMOL/L — SIGNIFICANT CHANGE UP (ref 22–31)
CREAT SERPL-MCNC: 1.05 MG/DL — SIGNIFICANT CHANGE UP (ref 0.5–1.3)
EOSINOPHIL # BLD AUTO: 0.13 K/UL — SIGNIFICANT CHANGE UP (ref 0–0.5)
EOSINOPHIL NFR BLD AUTO: 1.5 % — SIGNIFICANT CHANGE UP (ref 0–6)
GLUCOSE SERPL-MCNC: 118 MG/DL — HIGH (ref 70–99)
HCT VFR BLD CALC: 40.4 % — SIGNIFICANT CHANGE UP (ref 34.5–45)
HGB BLD-MCNC: 14.2 G/DL — SIGNIFICANT CHANGE UP (ref 11.5–15.5)
IMM GRANULOCYTES NFR BLD AUTO: 0.6 % — SIGNIFICANT CHANGE UP (ref 0–1.5)
LYMPHOCYTES # BLD AUTO: 2.34 K/UL — SIGNIFICANT CHANGE UP (ref 1–3.3)
LYMPHOCYTES # BLD AUTO: 26.3 % — SIGNIFICANT CHANGE UP (ref 13–44)
MCHC RBC-ENTMCNC: 35.1 GM/DL — SIGNIFICANT CHANGE UP (ref 32–36)
MCHC RBC-ENTMCNC: 35.1 PG — HIGH (ref 27–34)
MCV RBC AUTO: 100 FL — SIGNIFICANT CHANGE UP (ref 80–100)
MONOCYTES # BLD AUTO: 0.68 K/UL — SIGNIFICANT CHANGE UP (ref 0–0.9)
MONOCYTES NFR BLD AUTO: 7.6 % — SIGNIFICANT CHANGE UP (ref 2–14)
NEUTROPHILS # BLD AUTO: 5.64 K/UL — SIGNIFICANT CHANGE UP (ref 1.8–7.4)
NEUTROPHILS NFR BLD AUTO: 63.4 % — SIGNIFICANT CHANGE UP (ref 43–77)
PHOSPHATE SERPL-MCNC: 1.4 MG/DL — LOW (ref 2.5–4.5)
PLATELET # BLD AUTO: 213 K/UL — SIGNIFICANT CHANGE UP (ref 150–400)
POTASSIUM SERPL-MCNC: 4.6 MMOL/L — SIGNIFICANT CHANGE UP (ref 3.5–5.3)
POTASSIUM SERPL-SCNC: 4.6 MMOL/L — SIGNIFICANT CHANGE UP (ref 3.5–5.3)
RBC # BLD: 4.04 M/UL — SIGNIFICANT CHANGE UP (ref 3.8–5.2)
RBC # FLD: 17.6 % — HIGH (ref 10.3–14.5)
SODIUM SERPL-SCNC: 147 MMOL/L — HIGH (ref 135–145)
WBC # BLD: 8.89 K/UL — SIGNIFICANT CHANGE UP (ref 3.8–10.5)
WBC # FLD AUTO: 8.89 K/UL — SIGNIFICANT CHANGE UP (ref 3.8–10.5)

## 2019-06-27 RX ORDER — POTASSIUM PHOSPHATE, MONOBASIC POTASSIUM PHOSPHATE, DIBASIC 236; 224 MG/ML; MG/ML
15 INJECTION, SOLUTION INTRAVENOUS ONCE
Refills: 0 | Status: COMPLETED | OUTPATIENT
Start: 2019-06-27 | End: 2019-06-27

## 2019-06-27 RX ORDER — SODIUM CHLORIDE 9 MG/ML
1000 INJECTION, SOLUTION INTRAVENOUS
Refills: 0 | Status: DISCONTINUED | OUTPATIENT
Start: 2019-06-27 | End: 2019-06-29

## 2019-06-27 RX ORDER — SODIUM,POTASSIUM PHOSPHATES 278-250MG
1 POWDER IN PACKET (EA) ORAL ONCE
Refills: 0 | Status: COMPLETED | OUTPATIENT
Start: 2019-06-27 | End: 2019-06-27

## 2019-06-27 RX ADMIN — Medication 48 MILLIGRAM(S): at 12:27

## 2019-06-27 RX ADMIN — Medication 88 MICROGRAM(S): at 06:13

## 2019-06-27 RX ADMIN — APIXABAN 5 MILLIGRAM(S): 2.5 TABLET, FILM COATED ORAL at 18:05

## 2019-06-27 RX ADMIN — ATORVASTATIN CALCIUM 10 MILLIGRAM(S): 80 TABLET, FILM COATED ORAL at 20:35

## 2019-06-27 RX ADMIN — APIXABAN 5 MILLIGRAM(S): 2.5 TABLET, FILM COATED ORAL at 06:14

## 2019-06-27 RX ADMIN — Medication 100 MILLIGRAM(S): at 06:14

## 2019-06-27 RX ADMIN — Medication 81 MILLIGRAM(S): at 12:27

## 2019-06-27 RX ADMIN — POTASSIUM PHOSPHATE, MONOBASIC POTASSIUM PHOSPHATE, DIBASIC 63.75 MILLIMOLE(S): 236; 224 INJECTION, SOLUTION INTRAVENOUS at 12:00

## 2019-06-27 RX ADMIN — Medication 100 MILLIGRAM(S): at 18:05

## 2019-06-27 RX ADMIN — Medication 1 PACKET(S): at 06:13

## 2019-06-27 RX ADMIN — SODIUM CHLORIDE 70 MILLILITER(S): 9 INJECTION, SOLUTION INTRAVENOUS at 12:27

## 2019-06-27 RX ADMIN — SODIUM CHLORIDE 70 MILLILITER(S): 9 INJECTION, SOLUTION INTRAVENOUS at 20:34

## 2019-06-27 NOTE — PROGRESS NOTE ADULT - ASSESSMENT
87 yo woman admitted post unwitnessed fall at home found to have CARLOS and sever Hypercalcemia.  --CARLOS : likely due to severe volume depletion due to severe hypercalcemia, diuretics and ARB.    D/c ARB and Diuretics.  --Hypercalcemia of unclear etiology --causing severe volume depletion as well.  Check PTH, Vit D and Spep/KORI.  Due to severity, will start Calcitonin tonight in addition to IVF.  Hold off on Bisphosphonates for now.  Unable to obtain detailed hx  to assess for Milk-Alkali syndrome.  --A FIB : Can continue Eliquis with CARLOS/CKD --Recent study validated efficacy and safety of Eliquis in pts with advanced CKD and ESRD.    6/25 SY  --CARLOS/Likely a degree of CKD due to severe volume depletion and Hypercalcemia : Continue gentle hydration.  --Severe Hypercalcemia : PTH appropriately suppressed.   Follow up SPEP /KORI.  PTH rp.  Continue IVF and Calcitonin.  --Replete K.    6/26 SY  --CARLOS/CKD : renal function continues to improve.  --Hypercalcemia : unclear etiology.  Post Calcitonin x 4 doses.   Continue volume repletion.  Change to 1/2 NS.  Follow up SPEP /KORI /PTH rp.    6/27 SY  --CARLOS/CKD : renal function much improved with volume repletion  --Hypercalcemia  : responding well to IVF.  Post Calcitonin.  SPEP /KORI negative.  : unclear etiology.  Alk phos WNL to indicate bone pathology.  --Replete Phos.   Follow up PTH Rp.

## 2019-06-28 LAB
ALBUMIN SERPL ELPH-MCNC: 3.1 G/DL — LOW (ref 3.3–5)
ANION GAP SERPL CALC-SCNC: 9 MMOL/L — SIGNIFICANT CHANGE UP (ref 5–17)
BUN SERPL-MCNC: 32 MG/DL — HIGH (ref 7–23)
CALCIUM SERPL-MCNC: 9.4 MG/DL — SIGNIFICANT CHANGE UP (ref 8.5–10.1)
CHLORIDE SERPL-SCNC: 112 MMOL/L — HIGH (ref 96–108)
CO2 SERPL-SCNC: 24 MMOL/L — SIGNIFICANT CHANGE UP (ref 22–31)
CREAT SERPL-MCNC: 1.02 MG/DL — SIGNIFICANT CHANGE UP (ref 0.5–1.3)
GLUCOSE SERPL-MCNC: 90 MG/DL — SIGNIFICANT CHANGE UP (ref 70–99)
HCT VFR BLD CALC: 40.9 % — SIGNIFICANT CHANGE UP (ref 34.5–45)
HGB BLD-MCNC: 13.7 G/DL — SIGNIFICANT CHANGE UP (ref 11.5–15.5)
MCHC RBC-ENTMCNC: 32.6 PG — SIGNIFICANT CHANGE UP (ref 27–34)
MCHC RBC-ENTMCNC: 33.5 GM/DL — SIGNIFICANT CHANGE UP (ref 32–36)
MCV RBC AUTO: 97.4 FL — SIGNIFICANT CHANGE UP (ref 80–100)
PHOSPHATE SERPL-MCNC: 2.1 MG/DL — LOW (ref 2.5–4.5)
PLATELET # BLD AUTO: 202 K/UL — SIGNIFICANT CHANGE UP (ref 150–400)
POTASSIUM SERPL-MCNC: 3.9 MMOL/L — SIGNIFICANT CHANGE UP (ref 3.5–5.3)
POTASSIUM SERPL-SCNC: 3.9 MMOL/L — SIGNIFICANT CHANGE UP (ref 3.5–5.3)
RBC # BLD: 4.2 M/UL — SIGNIFICANT CHANGE UP (ref 3.8–5.2)
RBC # FLD: 15.9 % — HIGH (ref 10.3–14.5)
SODIUM SERPL-SCNC: 145 MMOL/L — SIGNIFICANT CHANGE UP (ref 135–145)
WBC # BLD: 8.64 K/UL — SIGNIFICANT CHANGE UP (ref 3.8–10.5)
WBC # FLD AUTO: 8.64 K/UL — SIGNIFICANT CHANGE UP (ref 3.8–10.5)

## 2019-06-28 RX ADMIN — Medication 100 MILLIGRAM(S): at 17:59

## 2019-06-28 RX ADMIN — SODIUM CHLORIDE 70 MILLILITER(S): 9 INJECTION, SOLUTION INTRAVENOUS at 09:10

## 2019-06-28 RX ADMIN — APIXABAN 5 MILLIGRAM(S): 2.5 TABLET, FILM COATED ORAL at 17:59

## 2019-06-28 RX ADMIN — Medication 100 MILLIGRAM(S): at 05:58

## 2019-06-28 RX ADMIN — Medication 88 MICROGRAM(S): at 05:58

## 2019-06-28 RX ADMIN — Medication 81 MILLIGRAM(S): at 11:16

## 2019-06-28 RX ADMIN — Medication 48 MILLIGRAM(S): at 11:16

## 2019-06-28 RX ADMIN — ATORVASTATIN CALCIUM 10 MILLIGRAM(S): 80 TABLET, FILM COATED ORAL at 21:42

## 2019-06-28 RX ADMIN — APIXABAN 5 MILLIGRAM(S): 2.5 TABLET, FILM COATED ORAL at 05:58

## 2019-06-28 NOTE — PROGRESS NOTE ADULT - ASSESSMENT
85 yo woman admitted post unwitnessed fall at home found to have CARLOS and sever Hypercalcemia.  --CARLOS : likely due to severe volume depletion due to severe hypercalcemia, diuretics and ARB.    D/c ARB and Diuretics.  --Hypercalcemia of unclear etiology --causing severe volume depletion as well.  Check PTH, Vit D and Spep/KORI.  Due to severity, will start Calcitonin tonight in addition to IVF.  Hold off on Bisphosphonates for now.  Unable to obtain detailed hx  to assess for Milk-Alkali syndrome.  --A FIB : Can continue Eliquis with CARLOS/CKD --Recent study validated efficacy and safety of Eliquis in pts with advanced CKD and ESRD.    6/25 SY  --CARLOS/Likely a degree of CKD due to severe volume depletion and Hypercalcemia : Continue gentle hydration.  --Severe Hypercalcemia : PTH appropriately suppressed.   Follow up SPEP /KORI.  PTH rp.  Continue IVF and Calcitonin.  --Replete K.    6/26 SY  --CARLOS/CKD : renal function continues to improve.  --Hypercalcemia : unclear etiology.  Post Calcitonin x 4 doses.   Continue volume repletion.  Change to 1/2 NS.  Follow up SPEP /KORI /PTH rp.    6/27 SY  --CARLOS/CKD : renal function much improved with volume repletion  --Hypercalcemia  : responding well to IVF.  Post Calcitonin.  SPEP /KORI negative.  : unclear etiology.  Alk phos WNL to indicate bone pathology.  --Replete Phos.   Follow up PTH Rp.    6/28  Labs improved  creat 1.02  calcium 9.4, alb 3.1  cont ivf   instructed pt to increase po fluids at home

## 2019-06-29 LAB
ALBUMIN SERPL ELPH-MCNC: 3.2 G/DL — LOW (ref 3.3–5)
ANION GAP SERPL CALC-SCNC: 9 MMOL/L — SIGNIFICANT CHANGE UP (ref 5–17)
BUN SERPL-MCNC: 35 MG/DL — HIGH (ref 7–23)
CALCIUM SERPL-MCNC: 8.8 MG/DL — SIGNIFICANT CHANGE UP (ref 8.5–10.1)
CHLORIDE SERPL-SCNC: 113 MMOL/L — HIGH (ref 96–108)
CO2 SERPL-SCNC: 24 MMOL/L — SIGNIFICANT CHANGE UP (ref 22–31)
CREAT SERPL-MCNC: 1.15 MG/DL — SIGNIFICANT CHANGE UP (ref 0.5–1.3)
CREATININE, URINE RESULT: 69 MG/DL — SIGNIFICANT CHANGE UP
GLUCOSE SERPL-MCNC: 98 MG/DL — SIGNIFICANT CHANGE UP (ref 70–99)
HCT VFR BLD CALC: 42.3 % — SIGNIFICANT CHANGE UP (ref 34.5–45)
HGB BLD-MCNC: 14.2 G/DL — SIGNIFICANT CHANGE UP (ref 11.5–15.5)
MCHC RBC-ENTMCNC: 32.1 PG — SIGNIFICANT CHANGE UP (ref 27–34)
MCHC RBC-ENTMCNC: 33.6 GM/DL — SIGNIFICANT CHANGE UP (ref 32–36)
MCV RBC AUTO: 95.5 FL — SIGNIFICANT CHANGE UP (ref 80–100)
PHOSPHATE SERPL-MCNC: 2.2 MG/DL — LOW (ref 2.5–4.5)
PLATELET # BLD AUTO: 238 K/UL — SIGNIFICANT CHANGE UP (ref 150–400)
POTASSIUM SERPL-MCNC: 4.3 MMOL/L — SIGNIFICANT CHANGE UP (ref 3.5–5.3)
POTASSIUM SERPL-SCNC: 4.3 MMOL/L — SIGNIFICANT CHANGE UP (ref 3.5–5.3)
PROT ?TM UR-MCNC: 19 MG/DL — HIGH (ref 0–12)
PTH RELATED PROT SERPL-MCNC: <2 PMOL/L — SIGNIFICANT CHANGE UP
RBC # BLD: 4.43 M/UL — SIGNIFICANT CHANGE UP (ref 3.8–5.2)
RBC # FLD: 15.8 % — HIGH (ref 10.3–14.5)
SODIUM SERPL-SCNC: 146 MMOL/L — HIGH (ref 135–145)
WBC # BLD: 9.71 K/UL — SIGNIFICANT CHANGE UP (ref 3.8–10.5)
WBC # FLD AUTO: 9.71 K/UL — SIGNIFICANT CHANGE UP (ref 3.8–10.5)

## 2019-06-29 RX ADMIN — ATORVASTATIN CALCIUM 10 MILLIGRAM(S): 80 TABLET, FILM COATED ORAL at 22:02

## 2019-06-29 RX ADMIN — Medication 48 MILLIGRAM(S): at 12:36

## 2019-06-29 RX ADMIN — APIXABAN 5 MILLIGRAM(S): 2.5 TABLET, FILM COATED ORAL at 19:43

## 2019-06-29 RX ADMIN — APIXABAN 5 MILLIGRAM(S): 2.5 TABLET, FILM COATED ORAL at 05:28

## 2019-06-29 RX ADMIN — Medication 100 MILLIGRAM(S): at 05:28

## 2019-06-29 RX ADMIN — Medication 88 MICROGRAM(S): at 06:46

## 2019-06-29 RX ADMIN — Medication 100 MILLIGRAM(S): at 19:43

## 2019-06-29 RX ADMIN — Medication 81 MILLIGRAM(S): at 12:36

## 2019-06-29 NOTE — PROGRESS NOTE ADULT - ASSESSMENT
85 yo woman admitted post unwitnessed fall at home found to have CARLOS and sever Hypercalcemia.  --CARLOS : likely due to severe volume depletion due to severe hypercalcemia, diuretics and ARB.    D/c ARB and Diuretics.  --Hypercalcemia of unclear etiology --causing severe volume depletion as well.  Check PTH, Vit D and Spep/KORI.  Due to severity, will start Calcitonin tonight in addition to IVF.  Hold off on Bisphosphonates for now.  Unable to obtain detailed hx  to assess for Milk-Alkali syndrome.  --A FIB : Can continue Eliquis with CARLOS/CKD --Recent study validated efficacy and safety of Eliquis in pts with advanced CKD and ESRD.    6/25 SY  --CARLOS/Likely a degree of CKD due to severe volume depletion and Hypercalcemia : Continue gentle hydration.  --Severe Hypercalcemia : PTH appropriately suppressed.   Follow up SPEP /KORI.  PTH rp.  Continue IVF and Calcitonin.  --Replete K.    6/26 SY  --CARLOS/CKD : renal function continues to improve.  --Hypercalcemia : unclear etiology.  Post Calcitonin x 4 doses.   Continue volume repletion.  Change to 1/2 NS.  Follow up SPEP /KORI /PTH rp.    6/27 SY  --CARLOS/CKD : renal function much improved with volume repletion  --Hypercalcemia  : responding well to IVF.  Post Calcitonin.  SPEP /KORI negative.  : unclear etiology.  Alk phos WNL to indicate bone pathology.  --Replete Phos.   Follow up PTH Rp.    6/28  Labs improved  creat 1.02  calcium 9.4, alb 3.1  cont ivf   instructed pt to increase po fluids at home    6/29 SY  --CARLOS/CKD : improved and stable  --Hypercalcemia ; resolved.  Still etiology remains unclear  All work up negative.  --D/c planning with outpt follow up.

## 2019-06-30 LAB
ALBUMIN SERPL ELPH-MCNC: 2.8 G/DL — LOW (ref 3.3–5)
ANION GAP SERPL CALC-SCNC: 8 MMOL/L — SIGNIFICANT CHANGE UP (ref 5–17)
BUN SERPL-MCNC: 27 MG/DL — HIGH (ref 7–23)
CALCIUM SERPL-MCNC: 8 MG/DL — LOW (ref 8.5–10.1)
CHLORIDE SERPL-SCNC: 115 MMOL/L — HIGH (ref 96–108)
CO2 SERPL-SCNC: 24 MMOL/L — SIGNIFICANT CHANGE UP (ref 22–31)
CREAT SERPL-MCNC: 1.07 MG/DL — SIGNIFICANT CHANGE UP (ref 0.5–1.3)
CULTURE RESULTS: SIGNIFICANT CHANGE UP
GLUCOSE SERPL-MCNC: 84 MG/DL — SIGNIFICANT CHANGE UP (ref 70–99)
PHOSPHATE SERPL-MCNC: 2.2 MG/DL — LOW (ref 2.5–4.5)
POTASSIUM SERPL-MCNC: 3.9 MMOL/L — SIGNIFICANT CHANGE UP (ref 3.5–5.3)
POTASSIUM SERPL-SCNC: 3.9 MMOL/L — SIGNIFICANT CHANGE UP (ref 3.5–5.3)
SODIUM SERPL-SCNC: 147 MMOL/L — HIGH (ref 135–145)
SPECIMEN SOURCE: SIGNIFICANT CHANGE UP

## 2019-06-30 RX ADMIN — Medication 81 MILLIGRAM(S): at 13:10

## 2019-06-30 RX ADMIN — APIXABAN 5 MILLIGRAM(S): 2.5 TABLET, FILM COATED ORAL at 06:04

## 2019-06-30 RX ADMIN — Medication 88 MICROGRAM(S): at 06:04

## 2019-06-30 RX ADMIN — ATORVASTATIN CALCIUM 10 MILLIGRAM(S): 80 TABLET, FILM COATED ORAL at 21:42

## 2019-06-30 RX ADMIN — Medication 100 MILLIGRAM(S): at 23:26

## 2019-06-30 RX ADMIN — Medication 100 MILLIGRAM(S): at 18:27

## 2019-06-30 RX ADMIN — APIXABAN 5 MILLIGRAM(S): 2.5 TABLET, FILM COATED ORAL at 18:25

## 2019-06-30 RX ADMIN — Medication 48 MILLIGRAM(S): at 13:03

## 2019-06-30 RX ADMIN — Medication 100 MILLIGRAM(S): at 06:04

## 2019-06-30 NOTE — PROGRESS NOTE ADULT - REASON FOR ADMISSION
s/p fall

## 2019-06-30 NOTE — PROGRESS NOTE ADULT - SUBJECTIVE AND OBJECTIVE BOX
86 y.o. female with PMH AFib on Eliquis, CHF (unknown EF), HTN, HLD, CKD stage 3, hypothyroidism, osteoporosis presents with s/p unwitnessed fall at home. Pt and  with grandchildren at bedside providing history. Pt's  reports that pt was found on the floor after getting out of the restroom. Pt denies hitting head. She denies fever, chills, n/v, CP, SOB. Per grandchildren at bedside, pt more confused and lethargic than her usual self. Reports she normally conversant and last known normal self was 2 days ago when grandson spoke with her. Now, she is more confused than usual. Reports she would say things and babble. Pt organizes her own medications in pill box. Pt denies taking extra meds.    06/27/19: Patient seen and examined. Still with confusion as per daughter, but improving. Discussed with  and daughter at bed side regarding management and d/c plan.   06/28/19; Patient seen and examined. More awake and alert today. Mental status much improved. Discussed with  at bed side.   06/29/19; Patient seen and examined. Sitting in a chair, smiling. She denies any complaints. Discussed with daughter and  at bed side regarding management and d/c plan.     REVIEW OF SYSTEMS:    CONSTITUTIONAL: No weakness, fevers or chills  EYES/ENT: No visual changes;  No vertigo or throat pain   NECK: No pain or stiffness  RESPIRATORY: No cough, wheezing, hemoptysis; No shortness of breath  CARDIOVASCULAR: No chest pain or palpitations  GASTROINTESTINAL: No abdominal or epigastric pain. No nausea, vomiting, or hematemesis; No diarrhea or constipation. No melena or hematochezia.  GENITOURINARY: No dysuria, frequency or hematuria  NEUROLOGICAL: No numbness or weakness  SKIN: No itching, burning, rashes, or lesions   All other review of systems is negative unless indicated above      Vital Signs Last 24 Hrs  T(C): 36.4 (29 Jun 2019 11:12), Max: 36.4 (29 Jun 2019 11:12)  T(F): 97.5 (29 Jun 2019 11:12), Max: 97.5 (29 Jun 2019 11:12)  HR: 69 (29 Jun 2019 11:12) (69 - 70)  BP: 133/67 (29 Jun 2019 11:12) (123/63 - 137/78)  BP(mean): --  RR: 18 (29 Jun 2019 11:12) (18 - 18)  SpO2: 98% (29 Jun 2019 11:12) (96% - 98%)    	      PHYSICAL EXAM:    Constitutional: NAD, awake and alert, well-developed  HEENT: PERR, EOMI, Normal Hearing, MMM  Neck: Soft and supple, No LAD, No JVD  Respiratory: Breath sounds are clear bilaterally, No wheezing, rales or rhonchi  Cardiovascular: S1 and S2, regular rate and rhythm, no Murmurs, gallops or rubs  Gastrointestinal: Bowel Sounds present, soft, nontender, nondistended, no guarding, no rebound  Extremities: No peripheral edema  Vascular: 2+ peripheral pulses  Neurological: A/O x 3, no focal deficits  Musculoskeletal: 5/5 strength b/l upper and lower extremities  Skin: No rashes          Labs:                                            14.2   9.71  )-----------( 238      ( 29 Jun 2019 06:54 )             42.3     29 Jun 2019 06:54    146    |  113    |  35     ----------------------------<  98     4.3     |  24     |  1.15     Ca    8.8        29 Jun 2019 06:54  Phos  2.2       29 Jun 2019 06:54    TPro  x      /  Alb  3.2    /  TBili  x      /  DBili  x      /  AST  x      /  ALT  x      /  AlkPhos  x      29 Jun 2019 06:54    LIVER FUNCTIONS - ( 29 Jun 2019 06:54 )  Alb: 3.2 g/dL / Pro: x     / ALK PHOS: x     / ALT: x     / AST: x     / GGT: x               MEDICATIONS:      MEDICATIONS  (STANDING):  apixaban 5 milliGRAM(s) Oral two times a day  atorvastatin 10 milliGRAM(s) Oral at bedtime  calcitonin Injectable 240 International Unit(s) SubCutaneous every 12 hours  clopidogrel Tablet 75 milliGRAM(s) Oral <User Schedule>  fenofibrate Tablet 48 milliGRAM(s) Oral daily  levothyroxine 88 MICROGram(s) Oral daily  metoprolol succinate  milliGRAM(s) Oral two times a day  sodium chloride 0.9% with potassium chloride 20 mEq/L 1000 milliLiter(s) (100 mL/Hr) IV Continuous <Continuous>                            Assessment and Plan:     86 y.o. female with PMH AFib on Eliquis, CHF (unknown EF), HTN, HLD, CKD stage 3, hypothyroidism, osteoporosis presents with s/p unwitnessed fall at home. Pt and  with grandchildren at bedside providing history. Pt's  reports that pt was found on the floor after getting out of the restroom. Pt denies hitting head. She denies fever, chills, n/v, CP, SOB. Per grandchildren at bedside, pt more confused and lethargic than her usual self. Reports she normally conversant and last known normal self was 2 days ago when grandson spoke with her. Now, she is more confused than usual. Reports she would say things and babble. Pt organizes her own medications in pill box. Pt denies taking extra meds.      #acute metabolic  encephalopathy due to hypercalcemia  Improving    #CARLOS on CKD stage 3 with hypercalcemia-resolved  #lethargy due to toxic encephalopathy likely from hypercalcemia: resolved  -cont iv hydration for one more day, monitor for fluid overload due to hx CHF  -SPEP: normal   -hold lasix, losartan, spironolactone, vit D, calcium for now, start slowly with lower dose  -renal consult and follow up appreciated     #elevated troponin likely demand ischemia with CARLOS    #s/p unwitnessed fall, reported right leg pain  -xrays reports noted  -CT head noted  -prn pain control  -PT eval appreciated  -Needs rehab placement    #CHF-chr systolic EF 45%  -I/O, daily weight  -lasix on hold    # ?Hematuria Vs melena-  Resolved    #AFib on Eliquis    #DVT ppx  -on eliquis
86 y.o. female with PMH AFib on Eliquis, CHF (unknown EF), HTN, HLD, CKD stage 3, hypothyroidism, osteoporosis presents with s/p unwitnessed fall at home. Pt and  with grandchildren at bedside providing history. Pt's  reports that pt was found on the floor after getting out of the restroom. Pt denies hitting head. She denies fever, chills, n/v, CP, SOB. Per grandchildren at bedside, pt more confused and lethargic than her usual self. Reports she normally conversant and last known normal self was 2 days ago when grandson spoke with her. Now, she is more confused than usual. Reports she would say things and babble. Pt organizes her own medications in pill box. Pt denies taking extra meds.    06/27/19: Patient seen and examined. Still with confusion as per daughter, but improving. Discussed with  and daughter at bed side regarding management and d/c plan.   06/28/19; Patient seen and examined. More awake and alert today. Mental status much improved. Discussed with  at bed side.   06/29/19; Patient seen and examined. Sitting in a chair, smiling. She denies any complaints. Discussed with daughter and  at bed side regarding management and d/c plan.  06/30/19: Patient seen and examined. She denies any complaints.      REVIEW OF SYSTEMS:    CONSTITUTIONAL: No weakness, fevers or chills  EYES/ENT: No visual changes;  No vertigo or throat pain   NECK: No pain or stiffness  RESPIRATORY: No cough, wheezing, hemoptysis; No shortness of breath  CARDIOVASCULAR: No chest pain or palpitations  GASTROINTESTINAL: No abdominal or epigastric pain. No nausea, vomiting, or hematemesis; No diarrhea or constipation. No melena or hematochezia.  GENITOURINARY: No dysuria, frequency or hematuria  NEUROLOGICAL: No numbness or weakness  SKIN: No itching, burning, rashes, or lesions   All other review of systems is negative unless indicated above      Vital Signs Last 24 Hrs  T(C): 37 (30 Jun 2019 05:18), Max: 37 (29 Jun 2019 18:52)  T(F): 98.6 (30 Jun 2019 05:18), Max: 98.6 (29 Jun 2019 18:52)  HR: 79 (30 Jun 2019 05:18) (70 - 79)  BP: 114/67 (30 Jun 2019 05:18) (114/67 - 118/56)  BP(mean): --  RR: 17 (30 Jun 2019 05:18) (17 - 18)  SpO2: 97% (30 Jun 2019 05:18) (97% - 100%)    	      PHYSICAL EXAM:    Constitutional: NAD, awake and alert, well-developed  HEENT: PERR, EOMI, Normal Hearing, MMM  Neck: Soft and supple, No LAD, No JVD  Respiratory: Breath sounds are clear bilaterally, No wheezing, rales or rhonchi  Cardiovascular: S1 and S2, regular rate and rhythm, no Murmurs, gallops or rubs  Gastrointestinal: Bowel Sounds present, soft, nontender, nondistended, no guarding, no rebound  Extremities: No peripheral edema  Vascular: 2+ peripheral pulses  Neurological: A/O x 3, no focal deficits  Musculoskeletal: 5/5 strength b/l upper and lower extremities  Skin: No rashes          Labs:                                            14.2   9.71  )-----------( 238      ( 29 Jun 2019 06:54 )             42.3     29 Jun 2019 06:54    146    |  113    |  35     ----------------------------<  98     4.3     |  24     |  1.15     Ca    8.8        29 Jun 2019 06:54  Phos  2.2       29 Jun 2019 06:54    TPro  x      /  Alb  3.2    /  TBili  x      /  DBili  x      /  AST  x      /  ALT  x      /  AlkPhos  x      29 Jun 2019 06:54    LIVER FUNCTIONS - ( 29 Jun 2019 06:54 )  Alb: 3.2 g/dL / Pro: x     / ALK PHOS: x     / ALT: x     / AST: x     / GGT: x               MEDICATIONS:      MEDICATIONS  (STANDING):  apixaban 5 milliGRAM(s) Oral two times a day  atorvastatin 10 milliGRAM(s) Oral at bedtime  calcitonin Injectable 240 International Unit(s) SubCutaneous every 12 hours  clopidogrel Tablet 75 milliGRAM(s) Oral <User Schedule>  fenofibrate Tablet 48 milliGRAM(s) Oral daily  levothyroxine 88 MICROGram(s) Oral daily  metoprolol succinate  milliGRAM(s) Oral two times a day  sodium chloride 0.9% with potassium chloride 20 mEq/L 1000 milliLiter(s) (100 mL/Hr) IV Continuous <Continuous>                            Assessment and Plan:     86 y.o. female with PMH AFib on Eliquis, CHF (unknown EF), HTN, HLD, CKD stage 3, hypothyroidism, osteoporosis presents with s/p unwitnessed fall at home. Pt and  with grandchildren at bedside providing history. Pt's  reports that pt was found on the floor after getting out of the restroom. Pt denies hitting head. She denies fever, chills, n/v, CP, SOB. Per grandchildren at bedside, pt more confused and lethargic than her usual self. Reports she normally conversant and last known normal self was 2 days ago when grandson spoke with her. Now, she is more confused than usual. Reports she would say things and babble. Pt organizes her own medications in pill box. Pt denies taking extra meds.      #acute metabolic  encephalopathy due to hypercalcemia  Improving    #CARLOS on CKD stage 3 with hypercalcemia-resolved  #lethargy due to toxic encephalopathy likely from hypercalcemia: resolved  -S/P IV fluid  -SPEP: normal   -hold lasix, losartan, spironolactone, vit D, calcium for now, start slowly with lower dose  -renal consult and follow up appreciated     #elevated troponin likely demand ischemia with CARLOS    #s/p unwitnessed fall, reported right leg pain  -xrays reports noted  -CT head noted  -prn pain control  -PT eval appreciated  -Needs rehab placement    #CHF-chr systolic EF 45%  -I/O, daily weight  -lasix on hold    # ?Hematuria Vs melena-  Resolved    #AFib on Eliquis    #DVT ppx  -on eliquis    Possible d/c to rehab tomorrow.
86 y.o. female with PMH AFib on Eliquis, CHF (unknown EF), HTN, HLD, CKD stage 3, hypothyroidism, osteoporosis presents with s/p unwitnessed fall at home. Pt and  with grandchildren at bedside providing history. Pt's  reports that pt was found on the floor after getting out of the restroom. Pt denies hitting head. She denies fever, chills, n/v, CP, SOB. Per grandchildren at bedside, pt more confused and lethargic than her usual self. Reports she normally conversant and last known normal self was 2 days ago when grandson spoke with her. Now, she is more confused than usual. Reports she would say things and babble. Pt organizes her own medications in pill box. Pt denies taking extra meds.    19: Patient seen and examined. Still with confusion as per daughter, but improving. Discussed with  and daughter at bed side regarding management and d/c plan.     REVIEW OF SYSTEMS:    CONSTITUTIONAL: No weakness, fevers or chills  EYES/ENT: No visual changes;  No vertigo or throat pain   NECK: No pain or stiffness  RESPIRATORY: No cough, wheezing, hemoptysis; No shortness of breath  CARDIOVASCULAR: No chest pain or palpitations  GASTROINTESTINAL: No abdominal or epigastric pain. No nausea, vomiting, or hematemesis; No diarrhea or constipation. No melena or hematochezia.  GENITOURINARY: No dysuria, frequency or hematuria  NEUROLOGICAL: No numbness or weakness  SKIN: No itching, burning, rashes, or lesions   All other review of systems is negative unless indicated above    Vital Signs Last 24 Hrs  T(C): 36.4 (2019 11:16), Max: 36.4 (2019 11:16)  T(F): 97.6 (2019 11:16), Max: 97.6 (2019 11:16)  HR: 70 (2019 11:16) (70 - 71)  BP: 108/62 (2019 11:16) (108/62 - 134/77)  BP(mean): --  RR: 18 (2019 11:16) (17 - 18)  SpO2: 94% (2019 11:16) (94% - 96%)      PHYSICAL EXAM:    Constitutional: NAD, awake and alert, well-developed  HEENT: PERR, EOMI, Normal Hearing, MMM  Neck: Soft and supple, No LAD, No JVD  Respiratory: Breath sounds are clear bilaterally, No wheezing, rales or rhonchi  Cardiovascular: S1 and S2, regular rate and rhythm, no Murmurs, gallops or rubs  Gastrointestinal: Bowel Sounds present, soft, nontender, nondistended, no guarding, no rebound  Extremities: No peripheral edema  Vascular: 2+ peripheral pulses  Neurological: A/O x 3, no focal deficits  Musculoskeletal: 5/5 strength b/l upper and lower extremities  Skin: No rashes          Labs:                           14.2   8.89  )-----------( 213      ( 2019 06:56 )             40.4     2019 06:56    147    |  112    |  29     ----------------------------<  118    4.6     |  27     |  1.05     Ca    10.2       2019 06:56  Phos  1.4       2019 06:56    TPro  x      /  Alb  3.1    /  TBili  x      /  DBili  x      /  AST  x      /  ALT  x      /  AlkPhos  x      2019 06:56    LIVER FUNCTIONS - ( 2019 06:56 )  Alb: 3.1 g/dL / Pro: x     / ALK PHOS: x     / ALT: x     / AST: x     / GGT: x             CAPILLARY BLOOD GLUCOSE            Urinalysis Basic - ( 2019 19:45 )    Color: Yellow / Appearance: Clear / S.020 / pH: x  Gluc: x / Ketone: Negative  / Bili: Negative / Urobili: Negative mg/dL   Blood: x / Protein: 30 mg/dL / Nitrite: Negative   Leuk Esterase: Moderate / RBC: >50 /HPF / WBC 3-5   Sq Epi: x / Non Sq Epi: Moderate / Bacteria: Many              MEDICATIONS:  MEDICATIONS  (STANDING):  apixaban 5 milliGRAM(s) Oral two times a day  atorvastatin 10 milliGRAM(s) Oral at bedtime  calcitonin Injectable 240 International Unit(s) SubCutaneous every 12 hours  clopidogrel Tablet 75 milliGRAM(s) Oral <User Schedule>  fenofibrate Tablet 48 milliGRAM(s) Oral daily  levothyroxine 88 MICROGram(s) Oral daily  metoprolol succinate  milliGRAM(s) Oral two times a day  sodium chloride 0.9% with potassium chloride 20 mEq/L 1000 milliLiter(s) (100 mL/Hr) IV Continuous <Continuous>                            Assessment and Plan:     86 y.o. female with PMH AFib on Eliquis, CHF (unknown EF), HTN, HLD, CKD stage 3, hypothyroidism, osteoporosis presents with s/p unwitnessed fall at home. Pt and  with grandchildren at bedside providing history. Pt's  reports that pt was found on the floor after getting out of the restroom. Pt denies hitting head. She denies fever, chills, n/v, CP, SOB. Per grandchildren at bedside, pt more confused and lethargic than her usual self. Reports she normally conversant and last known normal self was 2 days ago when grandson spoke with her. Now, she is more confused than usual. Reports she would say things and babble. Pt organizes her own medications in pill box. Pt denies taking extra meds.      #acute metabolic  encephalopathy due to hypercalcemia  Improving    #CARLOS on CKD stage 3 with hypercalcemia  #lethargy due to toxic encephalopathy likely from hypercalcemia  -cont iv hydration, monitor for fluid overload due to hx CHF  -Follow SPEP  -hold lasix, losartan, spironolactone, vit D, calcium  -renal consult and follow up appreciated     #elevated troponin likely demand ischemia with CARLOS    #s/p unwitnessed fall, reported right leg pain  -xrays reports noted  -CT head noted  -prn pain control  -PT eval    #CHF-chr systolic EF 45%  -I/O, daily weight  -meds held due to above    # ?Hematuria Vs melena-  Follow stool for gauiac    follow cbc-stable    #AFib on Eliquis    #DVT ppx  -on eliquis
86 y.o. female with PMH AFib on Eliquis, CHF (unknown EF), HTN, HLD, CKD stage 3, hypothyroidism, osteoporosis presents with s/p unwitnessed fall at home. Pt and  with grandchildren at bedside providing history. Pt's  reports that pt was found on the floor after getting out of the restroom. Pt denies hitting head. She denies fever, chills, n/v, CP, SOB. Per grandchildren at bedside, pt more confused and lethargic than her usual self. Reports she normally conversant and last known normal self was 2 days ago when grandson spoke with her. Now, she is more confused than usual. Reports she would say things and babble. Pt organizes her own medications in pill box. Pt denies taking extra meds.    19: Patient seen and examined. Still with confusion as per daughter, but improving. Discussed with  and daughter at bed side regarding management and d/c plan.   19; Patient seen and examined. More awake and alert today. Mental status much improved. Discussed with  at bed side.     REVIEW OF SYSTEMS:    CONSTITUTIONAL: No weakness, fevers or chills  EYES/ENT: No visual changes;  No vertigo or throat pain   NECK: No pain or stiffness  RESPIRATORY: No cough, wheezing, hemoptysis; No shortness of breath  CARDIOVASCULAR: No chest pain or palpitations  GASTROINTESTINAL: No abdominal or epigastric pain. No nausea, vomiting, or hematemesis; No diarrhea or constipation. No melena or hematochezia.  GENITOURINARY: No dysuria, frequency or hematuria  NEUROLOGICAL: No numbness or weakness  SKIN: No itching, burning, rashes, or lesions   All other review of systems is negative unless indicated above    Vital Signs Last 24 Hrs  T(C): 36.4 (2019 11:16), Max: 36.4 (2019 11:16)  T(F): 97.6 (2019 11:16), Max: 97.6 (2019 11:16)  HR: 70 (2019 11:16) (70 - 71)  BP: 108/62 (2019 11:16) (108/62 - 134/77)  BP(mean): --  RR: 18 (2019 11:16) (17 - 18)  SpO2: 94% (2019 11:16) (94% - 96%)      PHYSICAL EXAM:    Constitutional: NAD, awake and alert, well-developed  HEENT: PERR, EOMI, Normal Hearing, MMM  Neck: Soft and supple, No LAD, No JVD  Respiratory: Breath sounds are clear bilaterally, No wheezing, rales or rhonchi  Cardiovascular: S1 and S2, regular rate and rhythm, no Murmurs, gallops or rubs  Gastrointestinal: Bowel Sounds present, soft, nontender, nondistended, no guarding, no rebound  Extremities: No peripheral edema  Vascular: 2+ peripheral pulses  Neurological: A/O x 3, no focal deficits  Musculoskeletal: 5/5 strength b/l upper and lower extremities  Skin: No rashes          Labs:                           14.2   8.89  )-----------( 213      ( 2019 06:56 )             40.4     2019 06:56    147    |  112    |  29     ----------------------------<  118    4.6     |  27     |  1.05     Ca    10.2       2019 06:56  Phos  1.4       2019 06:56    TPro  x      /  Alb  3.1    /  TBili  x      /  DBili  x      /  AST  x      /  ALT  x      /  AlkPhos  x      2019 06:56    LIVER FUNCTIONS - ( 2019 06:56 )  Alb: 3.1 g/dL / Pro: x     / ALK PHOS: x     / ALT: x     / AST: x     / GGT: x             CAPILLARY BLOOD GLUCOSE            Urinalysis Basic - ( 2019 19:45 )    Color: Yellow / Appearance: Clear / S.020 / pH: x  Gluc: x / Ketone: Negative  / Bili: Negative / Urobili: Negative mg/dL   Blood: x / Protein: 30 mg/dL / Nitrite: Negative   Leuk Esterase: Moderate / RBC: >50 /HPF / WBC 3-5   Sq Epi: x / Non Sq Epi: Moderate / Bacteria: Many              MEDICATIONS:  MEDICATIONS  (STANDING):  apixaban 5 milliGRAM(s) Oral two times a day  atorvastatin 10 milliGRAM(s) Oral at bedtime  calcitonin Injectable 240 International Unit(s) SubCutaneous every 12 hours  clopidogrel Tablet 75 milliGRAM(s) Oral <User Schedule>  fenofibrate Tablet 48 milliGRAM(s) Oral daily  levothyroxine 88 MICROGram(s) Oral daily  metoprolol succinate  milliGRAM(s) Oral two times a day  sodium chloride 0.9% with potassium chloride 20 mEq/L 1000 milliLiter(s) (100 mL/Hr) IV Continuous <Continuous>                            Assessment and Plan:     86 y.o. female with PMH AFib on Eliquis, CHF (unknown EF), HTN, HLD, CKD stage 3, hypothyroidism, osteoporosis presents with s/p unwitnessed fall at home. Pt and  with grandchildren at bedside providing history. Pt's  reports that pt was found on the floor after getting out of the restroom. Pt denies hitting head. She denies fever, chills, n/v, CP, SOB. Per grandchildren at bedside, pt more confused and lethargic than her usual self. Reports she normally conversant and last known normal self was 2 days ago when grandson spoke with her. Now, she is more confused than usual. Reports she would say things and babble. Pt organizes her own medications in pill box. Pt denies taking extra meds.      #acute metabolic  encephalopathy due to hypercalcemia  Improving    #CARLOS on CKD stage 3 with hypercalcemia  #lethargy due to toxic encephalopathy likely from hypercalcemia: resolving  -cont iv hydration for one more day, monitor for fluid overload due to hx CHF  -SPEP: nrmal  -hold lasix, losartan, spironolactone, vit D, calcium  -renal consult and follow up appreciated     #elevated troponin likely demand ischemia with CARLOS    #s/p unwitnessed fall, reported right leg pain  -xrays reports noted  -CT head noted  -prn pain control  -PT eval appreciated    #CHF-chr systolic EF 45%  -I/O, daily weight  -meds held due to above    # ?Hematuria Vs melena-  Resolved    #AFib on Eliquis    #DVT ppx  -on eliquis
CHIEF COMPLAINT/Diagnosis: fall/ encephalopathy    SUBJECTIVE: patient somewhat disoriented, encephalopathic    REVIEW OF SYSTEMS:    CONSTITUTIONAL: No weakness, fevers or chills  EYES/ENT: No visual changes;  No vertigo or throat pain   NECK: No pain or stiffness  RESPIRATORY: No cough, wheezing, hemoptysis; No shortness of breath  CARDIOVASCULAR: No chest pain or palpitations  GASTROINTESTINAL: No abdominal or epigastric pain. No nausea, vomiting, or hematemesis; No diarrhea or constipation. No melena or hematochezia.  GENITOURINARY: No dysuria, frequency or hematuria  NEUROLOGICAL: No numbness or weakness  SKIN: No itching, burning, rashes, or lesions   All other review of systems is negative unless indicated above    Vital Signs Last 24 Hrs  T(C): 36.4 (25 Jun 2019 17:00), Max: 36.9 (24 Jun 2019 20:51)  T(F): 97.5 (25 Jun 2019 17:00), Max: 98.5 (24 Jun 2019 20:51)  HR: 70 (25 Jun 2019 17:00) (70 - 74)  BP: 135/66 (25 Jun 2019 17:00) (125/58 - 138/63)  BP(mean): --  RR: 16 (25 Jun 2019 17:00) (16 - 21)  SpO2: 95% (25 Jun 2019 17:00) (90% - 95%)    I&O's Summary      CAPILLARY BLOOD GLUCOSE          PHYSICAL EXAM:    Constitutional: NAD, awake and alert, well-developed  HEENT: PERR, EOMI, Normal Hearing, MMM  Neck: Soft and supple, No LAD, No JVD  Respiratory: Breath sounds are clear bilaterally, No wheezing, rales or rhonchi  Cardiovascular: S1 and S2, regular rate and rhythm, no Murmurs, gallops or rubs  Gastrointestinal: Bowel Sounds present, soft, nontender, nondistended, no guarding, no rebound  Extremities: No peripheral edema  Vascular: 2+ peripheral pulses  Neurological: A/O x 3, no focal deficits  Musculoskeletal: 5/5 strength b/l upper and lower extremities  Skin: No rashes    MEDICATIONS:  MEDICATIONS  (STANDING):  apixaban 5 milliGRAM(s) Oral two times a day  atorvastatin 10 milliGRAM(s) Oral at bedtime  calcitonin Injectable 240 International Unit(s) SubCutaneous every 12 hours  clopidogrel Tablet 75 milliGRAM(s) Oral <User Schedule>  fenofibrate Tablet 48 milliGRAM(s) Oral daily  levothyroxine 88 MICROGram(s) Oral daily  metoprolol succinate  milliGRAM(s) Oral two times a day  sodium chloride 0.9% with potassium chloride 20 mEq/L 1000 milliLiter(s) (100 mL/Hr) IV Continuous <Continuous>      LABS: All Labs Reviewed:                        16.3   11.30 )-----------( 299      ( 25 Jun 2019 07:03 )             48.9     06-25    141  |  104  |  47<H>  ----------------------------<  125<H>  3.1<L>   |  30  |  1.43<H>    Ca    12.2<H>      25 Jun 2019 07:03  Phos  2.7     06-25  Mg     2.3     06-25    TPro  6.4  /  Alb  x   /  TBili  x   /  DBili  x   /  AST  x   /  ALT  x   /  AlkPhos  x   06-25    PT/INR - ( 24 Jun 2019 14:21 )   PT: 26.4 sec;   INR: 2.32 ratio         PTT - ( 24 Jun 2019 14:21 )  PTT:36.7 sec  CARDIAC MARKERS ( 24 Jun 2019 23:28 )  0.091 ng/mL / x     / x     / x     / x      CARDIAC MARKERS ( 24 Jun 2019 19:46 )  0.089 ng/mL / x     / x     / x     / x      CARDIAC MARKERS ( 24 Jun 2019 14:21 )  0.073 ng/mL / x     / x     / x     / x              Assessment and Plan:     86 y.o. female with PMH AFib on Eliquis, CHF (unknown EF), HTN, HLD, CKD stage 3, hypothyroidism, osteoporosis presents with s/p unwitnessed fall at home. Pt and  with grandchildren at bedside providing history. Pt's  reports that pt was found on the floor after getting out of the restroom. Pt denies hitting head. She denies fever, chills, n/v, CP, SOB. Per grandchildren at bedside, pt more confused and lethargic than her usual self. Reports she normally conversant and last known normal self was 2 days ago when grandson spoke with her. Now, she is more confused than usual. Reports she would say things and babble. Pt organizes her own medications in pill box. Pt denies taking extra meds.      #acute encephalopathy  -unclear etiology, must rule out infectiuos causes, espeically with leukocytosis  -stat urine culture  -stat blood culture  -xray clear    #CARLOS on CKD stage 3 with hypercalcemia  #lethargy due to toxic encephalopathy likely from hypercalcemia  -pt received 1L NS in ED  -cont iv hydration, monitor for fluid overload due to hx CHF  -check iCa, PTH, vit D level, TSH  -hold lasix, losartan, spironolactone, vit D, calcium  -renal consult  -discussed case with Dr Nance, who will first review the chart    #elevated troponin likely demand ischemia with CARLOS  -trend troponins and EKG  -echo  -cardio consult, Dr Akins    #s/p unwitnessed fall, reported right leg pain  -xrays reports noted  -CT head noted  -prn pain control  -PT eval    #CHF  -unknown EF  -I/O, daily weight  -echo  -meds held due to above    #AFib on Eliquis      #DVT ppx  -on eliquis    #advanced care planning  -unable to ask patient at this time due to her encephalopathy  -discussed with  and grandchildren at bedside, who reports that pt's son is HCP and as far as they know, the patient is FULL code  -states that if anything changes, they will inform us  -pt currently is FULL CODE  -time: 16 min        RISK
CHIEF COMPLAINT/Diagnosis: fall/ encephalopathy    SUBJECTIVE: patient somewhat disoriented, encephalopathic    REVIEW OF SYSTEMS:    CONSTITUTIONAL: No weakness, fevers or chills  EYES/ENT: No visual changes;  No vertigo or throat pain   NECK: No pain or stiffness  RESPIRATORY: No cough, wheezing, hemoptysis; No shortness of breath  CARDIOVASCULAR: No chest pain or palpitations  GASTROINTESTINAL: No abdominal or epigastric pain. No nausea, vomiting, or hematemesis; No diarrhea or constipation. No melena or hematochezia.  GENITOURINARY: No dysuria, frequency or hematuria  NEUROLOGICAL: No numbness or weakness  SKIN: No itching, burning, rashes, or lesions   All other review of systems is negative unless indicated above    Vital Signs Last 24 Hrs  T(C): 36.4 (25 Jun 2019 17:00), Max: 36.9 (24 Jun 2019 20:51)  T(F): 97.5 (25 Jun 2019 17:00), Max: 98.5 (24 Jun 2019 20:51)  HR: 70 (25 Jun 2019 17:00) (70 - 74)  BP: 135/66 (25 Jun 2019 17:00) (125/58 - 138/63)  BP(mean): --  RR: 16 (25 Jun 2019 17:00) (16 - 21)  SpO2: 95% (25 Jun 2019 17:00) (90% - 95%)    I&O's Summary      CAPILLARY BLOOD GLUCOSE          PHYSICAL EXAM:    Constitutional: NAD, awake and alert, well-developed  HEENT: PERR, EOMI, Normal Hearing, MMM  Neck: Soft and supple, No LAD, No JVD  Respiratory: Breath sounds are clear bilaterally, No wheezing, rales or rhonchi  Cardiovascular: S1 and S2, regular rate and rhythm, no Murmurs, gallops or rubs  Gastrointestinal: Bowel Sounds present, soft, nontender, nondistended, no guarding, no rebound  Extremities: No peripheral edema  Vascular: 2+ peripheral pulses  Neurological: A/O x 3, no focal deficits  Musculoskeletal: 5/5 strength b/l upper and lower extremities  Skin: No rashes    MEDICATIONS:  MEDICATIONS  (STANDING):  apixaban 5 milliGRAM(s) Oral two times a day  atorvastatin 10 milliGRAM(s) Oral at bedtime  calcitonin Injectable 240 International Unit(s) SubCutaneous every 12 hours  clopidogrel Tablet 75 milliGRAM(s) Oral <User Schedule>  fenofibrate Tablet 48 milliGRAM(s) Oral daily  levothyroxine 88 MICROGram(s) Oral daily  metoprolol succinate  milliGRAM(s) Oral two times a day  sodium chloride 0.9% with potassium chloride 20 mEq/L 1000 milliLiter(s) (100 mL/Hr) IV Continuous <Continuous>      LABS: All Labs Reviewed:                        16.3   11.30 )-----------( 299      ( 25 Jun 2019 07:03 )             48.9     06-25    141  |  104  |  47<H>  ----------------------------<  125<H>  3.1<L>   |  30  |  1.43<H>    Ca    12.2<H>      25 Jun 2019 07:03  Phos  2.7     06-25  Mg     2.3     06-25    TPro  6.4  /  Alb  x   /  TBili  x   /  DBili  x   /  AST  x   /  ALT  x   /  AlkPhos  x   06-25    PT/INR - ( 24 Jun 2019 14:21 )   PT: 26.4 sec;   INR: 2.32 ratio         PTT - ( 24 Jun 2019 14:21 )  PTT:36.7 sec  CARDIAC MARKERS ( 24 Jun 2019 23:28 )  0.091 ng/mL / x     / x     / x     / x      CARDIAC MARKERS ( 24 Jun 2019 19:46 )  0.089 ng/mL / x     / x     / x     / x      CARDIAC MARKERS ( 24 Jun 2019 14:21 )  0.073 ng/mL / x     / x     / x     / x              Assessment and Plan:     86 y.o. female with PMH AFib on Eliquis, CHF (unknown EF), HTN, HLD, CKD stage 3, hypothyroidism, osteoporosis presents with s/p unwitnessed fall at home. Pt and  with grandchildren at bedside providing history. Pt's  reports that pt was found on the floor after getting out of the restroom. Pt denies hitting head. She denies fever, chills, n/v, CP, SOB. Per grandchildren at bedside, pt more confused and lethargic than her usual self. Reports she normally conversant and last known normal self was 2 days ago when grandson spoke with her. Now, she is more confused than usual. Reports she would say things and babble. Pt organizes her own medications in pill box. Pt denies taking extra meds.      #acute metabolic  encephalopathy due to hypercalcemia  Improving    #CARLOS on CKD stage 3 with hypercalcemia  #lethargy due to toxic encephalopathy likely from hypercalcemia  -cont iv hydration, monitor for fluid overload due to hx CHF  -Follow SPEP  -hold lasix, losartan, spironolactone, vit D, calcium  -renal consult and follow up apprecaiated    #elevated troponin likely demand ischemia with CARLOS    #s/p unwitnessed fall, reported right leg pain  -xrays reports noted  -CT head noted  -prn pain control  -PT eval    #CHF  -unknown EF  -I/O, daily weight  -echo  -meds held due to above    # ?Hematuria Vs melena-  Follow stool for guiac   follow cbc    #AFib on Eliquis    #DVT ppx  -on eliquis
Initial Consult HPI  RHONA NOEL is a 86y year old Female with a past medical history of chronic atrial fibrillation previously on warfarin, but switched to a direct oral anticoagulant with Eliquis due to GI bleed, history of chronic diastolic heart failure, history of sick sinus syndrome status post [employer]  pacemaker implantation, hypertension, hyperlipidemia, hypothyroidism, osteoporosis and chronic kidney disease.     The patient now presents for weakness, and a fall that occurred at home. The fall was unwitnessed. Patient is confused, and could not provide an adequate history. She was noted to have acute kidney insufficiency and hypercalcemia.  Cardiac enzymes were mildly elevated.  Echocardiogram from April of this year showed Preserved LVEF with moderate mitral valve regurgitation and severe left atrial dilatation  She had a cath in  which showed no significant CAD. She has not undergone any recent stress test.  __________________________  The patient was seen and examined. No acute events overnight.  Confused  Cr improved          Echo 2019  --There is severe left atrial dilatation (LA volume index 61 ml/m²).  --There is moderate left ventricular hypertrophy.  --LV global wall motion is normal.  --LV ejection fraction (64 %) is normal.  --Left ventricular diastolic dysfunction with elevated left atrial pressure.  --The left ventricular ejection fraction is normal at 60-65%.  --There is right atrial dilatation.  --There is a pacemaker in the right heart.  --There is mild aortic valve thickening.  --There is a mild obstructive flow pattern in LVOT. At rest, the left ventricular outflow   gradient is 3 mmHg. With the Valsalva maneuver, the left ventricular outflow gradient is 4   mmHg.  --There is mitral annular calcification. There is mild to moderate mitral regurgitation.  --There is mild to moderate tricuspid regurgitation.  --There is mild to moderate pulmonic regurgitation.  --The right atrial pressure is 6 - 10 mm Hg. There is mild pulmonary hypertension.    _______________________________  Review of systems: A 10 point review of system has been performed, and is negative except for what has been mentioned in the above history of present illness.     PAST MEDICAL & SURGICAL HISTORY:  Chronic atrial fibrillation on anticoagulation warfarin  Carotid atherosclerosis  Hypertension  Hyperlipidemia  Chronic diastolic heart failure  Sick sinus syndrome Status post [employer] pacemaker  Coronary angiogram, pacemaker placement, gastric ulcer surgery, colonoscopy, EGD      Home Medications:  apixaban 5 mg oral tablet: 1 tab(s) orally every 12 hours (2019 16:47)  cholecalciferol 2000 intl units oral tablet: 1 tab(s) orally once a day (2019 16:47)  clopidogrel 75 mg oral tablet: 1 tab(s) orally every 48 hours (2019 16:47)  fenofibrate 48 mg oral tablet: 1 tab(s) orally once a day (2019 16:47)  Klor-Con 10 oral tablet, extended release: 1 tab(s) orally 2 times a day (2019 16:47)  Lasix 40 mg oral tablet: 1 tab(s) orally 2 times a day (2019 16:47)  levothyroxine 88 mcg (0.088 mg) oral capsule: 1 tab(s) orally once a day except take 1/2 tablet on Sundays (2019 16:47)  losartan 25 mg oral tablet: 1 tab(s) orally once a day (2019 16:47)  metoprolol succinate 100 mg oral tablet, extended release: 1 tab(s) orally 2 times a day (2019 16:47)  pravastatin 20 mg oral tablet: 1 tab(s) orally once a day (in the evening) (2019 16:47)  spironolactone 25 mg oral tablet: 0.5 tab(s) orally 2 times a day (2019 16:47)    MEDICATIONS  (STANDING):  apixaban 5 milliGRAM(s) Oral two times a day  atorvastatin 10 milliGRAM(s) Oral at bedtime  calcitonin Injectable 240 International Unit(s) SubCutaneous every 12 hours  clopidogrel Tablet 75 milliGRAM(s) Oral <User Schedule>  fenofibrate Tablet 48 milliGRAM(s) Oral daily  levothyroxine 88 MICROGram(s) Oral daily  metoprolol succinate  milliGRAM(s) Oral two times a day  potassium chloride    Tablet ER 40 milliEquivalent(s) Oral once  sodium chloride 0.9%. 1000 milliLiter(s) (100 mL/Hr) IV Continuous <Continuous>    MEDICATIONS  (PRN):  acetaminophen   Tablet .. 650 milliGRAM(s) Oral every 6 hours PRN Temp greater or equal to 38C (100.4F), Mild Pain (1 - 3)  docusate sodium 100 milliGRAM(s) Oral three times a day PRN Constipation  senna 2 Tablet(s) Oral at bedtime PRN Constipation    Vital Signs Last 24 Hrs  T(C): 36.3 (2019 11:29), Max: 36.9 (2019 20:51)  T(F): 97.3 (2019 11:29), Max: 98.5 (2019 20:51)  HR: 70 (2019 11:29) (70 - 77)  BP: 125/58 (2019 11:29) (125/58 - 140/90)  BP(mean): --  RR: 16 (2019 11:29) (15 - 21)  SpO2: 93% (2019 11:29) (90% - 100%)  I&O's Summary    ________________________________  PHYSICAL EXAM:  GENERAL APPEARANCE:  No acute distress  HEAD: normocephalic, atraumatic  NECK: supple, no jugular venous distention, no carotid bruit    HEART: irreg S1, S2 normal, 2/6 systolic murmur    CHEST: Left-sided pacemaker    LUNGS: Course    ABDOMEN soft, nontender, nondistended, with positive bowel sounds appreciated  EXTREMITIES: no clubbing, cyanosis, with minimal lower extremity edema  NEURO: Awake, answered questions with confusion - AAOx1-2  PSYC: cannot assess  SKIN:  Dry   ________________________________  TELEMETRY: Patient refusing telemetry    ECG: Per my interpretation, atrial fibrillation, with demand ventricular pacing at 73 bpm    LABS:                        16.3   11.30 )-----------( 299      ( 2019 07:03 )             48.9                 141  |  104  |  47<H>  ----------------------------<  125<H>  3.1<L>   |  30  |  1.43<H>    Ca    12.2<H>      2019 07:03  Phos  2.7     06  Mg     2.3     -    TPro  7.9  /  Alb  4.1  /  TBili  0.9  /  DBili  x   /  AST  23  /  ALT  20  /  AlkPhos  66  24      LIVER FUNCTIONS - ( 2019 14:21 )  Alb: 4.1 g/dL / Pro: 7.9 gm/dL / ALK PHOS: 66 U/L / ALT: 20 U/L / AST: 23 U/L / GGT: x         PT/INR - ( 2019 14:21 )   PT: 26.4 sec;   INR: 2.32 ratio         PTT - ( 2019 14:21 )  PTT:36.7 sec     @ 23:28  Trop-I  0.091  CK      --  CK-MB   --     @ 19:46  Trop-I  0.089  CK      --  CK-MB   --     @ 14:21  Trop-I  0.073  CK      --  CK-MB   --  Urinalysis Basic - ( 2019 15:10 )    Color: Yellow / Appearance: very cloudy / S.025 / pH: x  Gluc: x / Ketone: Negative  / Bili: Negative / Urobili: Negative mg/dL   Blood: x / Protein: 30 mg/dL / Nitrite: Negative   Leuk Esterase: Moderate / RBC: 0-2 /HPF / WBC 3-5   Sq Epi: x / Non Sq Epi: Many / Bacteria: TNTC      Pro BNP  8265  @ 14:21  D Dimer  --  @ 14:21    PT/INR - ( 2019 14:21 )   PT: 26.4 sec;   INR: 2.32 ratio         PTT - ( 2019 14:21 )  PTT:36.7 sec  Urinalysis Basic - ( 2019 15:10 )    Color: Yellow / Appearance: very cloudy / S.025 / pH: x  Gluc: x / Ketone: Negative  / Bili: Negative / Urobili: Negative mg/dL   Blood: x / Protein: 30 mg/dL / Nitrite: Negative   Leuk Esterase: Moderate / RBC: 0-2 /HPF / WBC 3-5   Sq Epi: x / Non Sq Epi: Many / Bacteria: TNTC    ________________________________         ASSESSMENT: This patient presents status post fall, was noted to have elevated cardiac enzymes, hypercalcemia and acute renal insufficiency due to volume depletion      Elevated cardiac enzymes, likely demand ischemia  Chronic atrial fibrillation on anticoagulation with Eliquis  Chronic diastolic heart failure-compensated  Carotid artery atherosclerosis  Hypertension and hyperlipidemia  Acute on chronic renal insufficiency  SSS s/p dual chamber PPM w/ a sanket sci device    PLAN:  Cr improved - cont with IVF  No sign of volume overload  Cont with anticoagulation   Elevated cardiac enzymes likely demand ischemia setting of renal insufficiency and hypercalcemia. No plan for in pt ischemic eval.  Beta blocker, statin, antiplatelet for cardio protective effect.  Renal eval noted    Will follow as needed.     __________________________________________________________________________  Thank you for allowing me to participate in the care of your patient. Please contact me should any questions arise.    BONY Ridley DO, FACC  [phone]
NEPHROLOGY INTERVAL HPI/OVERNIGHT EVENTS:        6/28  feels well  sitting OOB no complaints   present   mental state improved    6/27 SY  Alert with much improved mental status.    Able to get out of bed with assistance.    6/26 SY  Alert.  Answering questions more appropriately.  Today able to state her age as 85.    6/25 SY  Alert. No acute distress  Oriented to person only but recognizes her  readily.  Pt's  denies excessive use of tums or OTC meds or any special diets.    HPI:  87 yo woman with PMHX of A FIB, Hx of chronic CHF, HTN, hypothyroidism brought to ED post unwitnessed fall at home.  Per earlier report by pt's grandchildren, pt today noted to be confused.  At baseline, pt is able to care for herself and manage her own medications.  CT head was negative but pt is confused though able to answer simple questions.  Pt is alert and responsive, oriented to person only.  Responded she is in Hospital then proceeded to cry and became agitatedf stating her mom was in the hospital.  Renal evaluation requested due to CARLOS--creat 2.27  and severe Hypercalcemia--calcium 15.5.  Unknown recent renal function but creat 1.38 in 11/2017.  Pt on Lasix/Spironolactone and Losartan as an outpt.    PMHX and PSHX.  1.A FIB on Eliquis  2,CHF (? Diastolic per previous cardiology note)  3.HTN  4 Unclear CKD (creat 1.38 in 11/2017)  5.PPM  6.Hypothyroidism    MEDICATIONS  (STANDING):  apixaban 5 milliGRAM(s) Oral two times a day  aspirin  chewable 81 milliGRAM(s) Oral daily  atorvastatin 10 milliGRAM(s) Oral at bedtime  fenofibrate Tablet 48 milliGRAM(s) Oral daily  levothyroxine 88 MICROGram(s) Oral daily  metoprolol succinate  milliGRAM(s) Oral two times a day  sodium chloride 0.45%. 1000 milliLiter(s) (70 mL/Hr) IV Continuous <Continuous>    MEDICATIONS  (PRN):  acetaminophen   Tablet .. 650 milliGRAM(s) Oral every 6 hours PRN Temp greater or equal to 38C (100.4F), Mild Pain (1 - 3)  docusate sodium 100 milliGRAM(s) Oral three times a day PRN Constipation  senna 2 Tablet(s) Oral at bedtime PRN Constipation      I&O's Detail    Vital Signs Last 24 Hrs  T(C): 36.9 (28 Jun 2019 12:59), Max: 36.9 (28 Jun 2019 12:59)  T(F): 98.4 (28 Jun 2019 12:59), Max: 98.4 (28 Jun 2019 12:59)  HR: 70 (28 Jun 2019 12:59) (70 - 71)  BP: 107/70 (28 Jun 2019 12:59) (107/70 - 137/74)  BP(mean): --  RR: 19 (28 Jun 2019 12:59) (18 - 19)  SpO2: 97% (28 Jun 2019 12:59) (94% - 97%)    PHYSICAL EXAM:  Alert and comfortable Improved mental status  GENERAL: No distress  CHEST/LUNG: Clear to aus  HEART: S1s2 RRR  ABDOMEN: soft  EXTREMITIES: no edema  SKIN:                           13.7   8.64  )-----------( 202      ( 28 Jun 2019 08:38 )             40.9       06-28    145  |  112<H>  |  32<H>  ----------------------------<  90  3.9   |  24  |  1.02    Ca    9.4      28 Jun 2019 08:38  Phos  2.1     06-28    TPro  x   /  Alb  3.1<L>  /  TBili  x   /  DBili  x   /  AST  x   /  ALT  x   /  AlkPhos  x   06-28
NEPHROLOGY INTERVAL HPI/OVERNIGHT EVENTS:    Date of Service: 06-29-19 @ 21:17  6/29 SY  No acute events  Feeling well.   Able to ambulate short distances.    6/28  feels well  sitting OOB no complaints   present   mental state improved    6/27 SY  Alert with much improved mental status.    Able to get out of bed with assistance.    6/26 SY  Alert.  Answering questions more appropriately.  Today able to state her age as 85.    6/25 SY  Alert. No acute distress  Oriented to person only but recognizes her  readily.  Pt's  denies excessive use of tums or OTC meds or any special diets.    HPI:  85 yo woman with PMHX of A FIB, Hx of chronic CHF, HTN, hypothyroidism brought to ED post unwitnessed fall at home.  Per earlier report by pt's grandchildren, pt today noted to be confused.  At baseline, pt is able to care for herself and manage her own medications.  CT head was negative but pt is confused though able to answer simple questions.  Pt is alert and responsive, oriented to person only.  Responded she is in Hospital then proceeded to cry and became agitatedf stating her mom was in the hospital.  Renal evaluation requested due to CARLOS--creat 2.27  and severe Hypercalcemia--calcium 15.5.  Unknown recent renal function but creat 1.38 in 11/2017.  Pt on Lasix/Spironolactone and Losartan as an outpt.    PMHX and PSHX.  1.A FIB on Eliquis  2,CHF (? Diastolic per previous cardiology note)  3.HTN  4 Unclear CKD (creat 1.38 in 11/2017)  5.PPM  6.Hypothyroidism    MEDICATIONS  (STANDING):  apixaban 5 milliGRAM(s) Oral two times a day  aspirin  chewable 81 milliGRAM(s) Oral daily  atorvastatin 10 milliGRAM(s) Oral at bedtime  fenofibrate Tablet 48 milliGRAM(s) Oral daily  levothyroxine 88 MICROGram(s) Oral daily  metoprolol succinate  milliGRAM(s) Oral two times a day    MEDICATIONS  (PRN):  acetaminophen   Tablet .. 650 milliGRAM(s) Oral every 6 hours PRN Temp greater or equal to 38C (100.4F), Mild Pain (1 - 3)  docusate sodium 100 milliGRAM(s) Oral three times a day PRN Constipation  senna 2 Tablet(s) Oral at bedtime PRN Constipation    Vital Signs Last 24 Hrs  T(C): 36.4 (29 Jun 2019 20:49), Max: 37 (29 Jun 2019 18:52)  T(F): 97.6 (29 Jun 2019 20:49), Max: 98.6 (29 Jun 2019 18:52)  HR: 70 (29 Jun 2019 20:49) (69 - 70)  BP: 118/56 (29 Jun 2019 20:49) (117/68 - 133/67)  BP(mean): --  RR: 17 (29 Jun 2019 20:49) (17 - 18)  SpO2: 100% (29 Jun 2019 20:49) (97% - 100%)  Daily     Daily     06-28 @ 07:01  -  06-29 @ 07:00  --------------------------------------------------------  IN: 550 mL / OUT: 0 mL / NET: 550 mL    PHYSICAL EXAM: Alert and comfortable  GENERAL: no distress  CHEST/LUNG: Clear to aus  HEART: S1S2 RRR  ABDOMEN: soft  EXTREMITIES: no edema  SKIN:     LABS:                        14.2   9.71  )-----------( 238      ( 29 Jun 2019 06:54 )             42.3     06-29    146<H>  |  113<H>  |  35<H>  ----------------------------<  98  4.3   |  24  |  1.15    Ca    8.8      29 Jun 2019 06:54  Phos  2.2     06-29    TPro  x   /  Alb  3.2<L>  /  TBili  x   /  DBili  x   /  AST  x   /  ALT  x   /  AlkPhos  x   06-29        Phosphorus Level, Serum: 2.2 mg/dL (06-29 @ 06:54)          RADIOLOGY & ADDITIONAL TESTS:
NEPHROLOGY INTERVAL HPI/OVERNIGHT EVENTS:    Date of Service: 19 @ 11:04   SY  Alert.  Answering questions more appropriately.  Today able to state her age as 85.    6/25 SY  Alert. No acute distress  Oriented to person only but recognizes her  readily.  Pt's  denies excessive use of tums or OTC meds or any special diets.    HPI:  85 yo woman with PMHX of A FIB, Hx of chronic CHF, HTN, hypothyroidism brought to ED post unwitnessed fall at home.  Per earlier report by pt's grandchildren, pt today noted to be confused.  At baseline, pt is able to care for herself and manage her own medications.  CT head was negative but pt is confused though able to answer simple questions.  Pt is alert and responsive, oriented to person only.  Responded she is in Hospital then proceeded to cry and became agitatedf stating her mom was in the hospital.  Renal evaluation requested due to CARLOS--creat 2.27  and severe Hypercalcemia--calcium 15.5.  Unknown recent renal function but creat 1.38 in 2017.  Pt on Lasix/Spironolactone and Losartan as an outpt.    PMHX and PSHX.  1.A FIB on Eliquis  2,CHF (? Diastolic per previous cardiology note)  3.HTN  4 Unclear CKD (creat 1.38 in 2017)  5.PPM  6.Hypothyroidism    MEDICATIONS  (STANDING):  apixaban 5 milliGRAM(s) Oral two times a day  aspirin  chewable 81 milliGRAM(s) Oral daily  atorvastatin 10 milliGRAM(s) Oral at bedtime  calcitonin Injectable 240 International Unit(s) SubCutaneous every 12 hours  fenofibrate Tablet 48 milliGRAM(s) Oral daily  levothyroxine 88 MICROGram(s) Oral daily  metoprolol succinate  milliGRAM(s) Oral two times a day  sodium chloride 0.45% with potassium chloride 20 mEq/L 1000 milliLiter(s) (100 mL/Hr) IV Continuous <Continuous>    MEDICATIONS  (PRN):  acetaminophen   Tablet .. 650 milliGRAM(s) Oral every 6 hours PRN Temp greater or equal to 38C (100.4F), Mild Pain (1 - 3)  docusate sodium 100 milliGRAM(s) Oral three times a day PRN Constipation  senna 2 Tablet(s) Oral at bedtime PRN Constipation    Vital Signs Last 24 Hrs  T(C): 36.6 (2019 06:16), Max: 36.6 (2019 06:16)  T(F): 97.9 (2019 06:16), Max: 97.9 (2019 06:16)  HR: 71 (2019 06:16) (70 - 71)  BP: 136/70 (2019 06:16) (125/58 - 136/70)  BP(mean): --  RR: 18 (2019 06:16) (16 - 18)  SpO2: 94% (2019 06:16) (93% - 95%)  Daily     Daily Weight in k.2 (2019 06:16)      PHYSICAL EXAM:  Alert and comfortable  GENERAL: no distress  CHEST/LUNG: Clear to aus  HEART: S1S2 RRR  ABDOMEN: soft  EXTREMITIES: no edema  SKIN:     LABS:                        15.9   11.32 )-----------( 254      ( 2019 07:58 )             48.4         146<H>  |  110<H>  |  32<H>  ----------------------------<  118<H>  3.7   |  30  |  1.12    Ca    11.3<H>      2019 07:10  Phos  1.6       Mg     2.3         TPro  6.8  /  Alb  3.3  /  TBili  0.7  /  DBili  x   /  AST  22  /  ALT  17  /  AlkPhos  53  -26    PT/INR - ( 2019 14:21 )   PT: 26.4 sec;   INR: 2.32 ratio         PTT - ( 2019 14:21 )  PTT:36.7 sec  Urinalysis Basic - ( 2019 19:45 )    Color: Yellow / Appearance: Clear / S.020 / pH: x  Gluc: x / Ketone: Negative  / Bili: Negative / Urobili: Negative mg/dL   Blood: x / Protein: 30 mg/dL / Nitrite: Negative   Leuk Esterase: Moderate / RBC: >50 /HPF / WBC 3-5   Sq Epi: x / Non Sq Epi: Moderate / Bacteria: Many      Phosphorus Level, Serum: 1.6 mg/dL ( @ 07:10)          RADIOLOGY & ADDITIONAL TESTS:
NEPHROLOGY INTERVAL HPI/OVERNIGHT EVENTS:    Date of Service: 19 @ 15:09   SY  Alert with much improved mental status.    Able to get out of bed with assistance.     SY  Alert.  Answering questions more appropriately.  Today able to state her age as 85.     SY  Alert. No acute distress  Oriented to person only but recognizes her  readily.  Pt's  denies excessive use of tums or OTC meds or any special diets.    HPI:  87 yo woman with PMHX of A FIB, Hx of chronic CHF, HTN, hypothyroidism brought to ED post unwitnessed fall at home.  Per earlier report by pt's grandchildren, pt today noted to be confused.  At baseline, pt is able to care for herself and manage her own medications.  CT head was negative but pt is confused though able to answer simple questions.  Pt is alert and responsive, oriented to person only.  Responded she is in Hospital then proceeded to cry and became agitatedf stating her mom was in the hospital.  Renal evaluation requested due to CARLOS--creat 2.27  and severe Hypercalcemia--calcium 15.5.  Unknown recent renal function but creat 1.38 in 2017.  Pt on Lasix/Spironolactone and Losartan as an outpt.    PMHX and PSHX.  1.A FIB on Eliquis  2,CHF (? Diastolic per previous cardiology note)  3.HTN  4 Unclear CKD (creat 1.38 in 2017)  5.PPM  6.Hypothyroidism    MEDICATIONS  (STANDING):  apixaban 5 milliGRAM(s) Oral two times a day  aspirin  chewable 81 milliGRAM(s) Oral daily  atorvastatin 10 milliGRAM(s) Oral at bedtime  fenofibrate Tablet 48 milliGRAM(s) Oral daily  levothyroxine 88 MICROGram(s) Oral daily  metoprolol succinate  milliGRAM(s) Oral two times a day  sodium chloride 0.45%. 1000 milliLiter(s) (70 mL/Hr) IV Continuous <Continuous>    MEDICATIONS  (PRN):  acetaminophen   Tablet .. 650 milliGRAM(s) Oral every 6 hours PRN Temp greater or equal to 38C (100.4F), Mild Pain (1 - 3)  docusate sodium 100 milliGRAM(s) Oral three times a day PRN Constipation  senna 2 Tablet(s) Oral at bedtime PRN Constipation    Vital Signs Last 24 Hrs  T(C): 36.4 (2019 11:16), Max: 36.4 (2019 11:16)  T(F): 97.6 (2019 11:16), Max: 97.6 (2019 11:16)  HR: 70 (2019 11:16) (70 - 71)  BP: 108/62 (2019 11:16) (108/62 - 134/77)  BP(mean): --  RR: 18 (2019 11:16) (17 - 18)  SpO2: 94% (2019 11:16) (94% - 96%)  Daily     Daily Weight in k.4 (2019 06:39)     @ 07:01  -   @ 07:00  --------------------------------------------------------  IN: 1100 mL / OUT: 100 mL / NET: 1000 mL    PHYSICAL EXAM:  Alert and comfortable Improved mental status  GENERAL: No distress  CHEST/LUNG: Clear to aus  HEART: S1s2 RRR  ABDOMEN: soft  EXTREMITIES: no edema  SKIN:     LABS:                        14.2   8.89  )-----------( 213      ( 2019 06:56 )             40.4         147<H>  |  112<H>  |  29<H>  ----------------------------<  118<H>  4.6   |  27  |  1.05    Ca    10.2<H>      2019 06:56  Phos  1.4         TPro  x   /  Alb  3.1<L>  /  TBili  x   /  DBili  x   /  AST  x   /  ALT  x   /  AlkPhos  x         Urinalysis Basic - ( 2019 19:45 )    Color: Yellow / Appearance: Clear / S.020 / pH: x  Gluc: x / Ketone: Negative  / Bili: Negative / Urobili: Negative mg/dL   Blood: x / Protein: 30 mg/dL / Nitrite: Negative   Leuk Esterase: Moderate / RBC: >50 /HPF / WBC 3-5   Sq Epi: x / Non Sq Epi: Moderate / Bacteria: Many      Phosphorus Level, Serum: 1.4 mg/dL ( @ 06:56)          RADIOLOGY & ADDITIONAL TESTS:
NEPHROLOGY INTERVAL HPI/OVERNIGHT EVENTS:    Date of Service: 19 @ 15:47   SY  Alert. No acute distress  Oriented to person only but recognizes her  readily.  Pt's  denies excessive use of tums or OTC meds or any special diets.    HPI:  85 yo woman with PMHX of A FIB, Hx of chronic CHF, HTN, hypothyroidism brought to ED post unwitnessed fall at home.  Per earlier report by pt's grandchildren, pt today noted to be confused.  At baseline, pt is able to care for herself and manage her own medications.  CT head was negative but pt is confused though able to answer simple questions.  Pt is alert and responsive, oriented to person only.  Responded she is in Hospital then proceeded to cry and became agitatedf stating her mom was in the hospital.  Renal evaluation requested due to CARLOS--creat 2.27  and severe Hypercalcemia--calcium 15.5.  Unknown recent renal function but creat 1.38 in 2017.  Pt on Lasix/Spironolactone and Losartan as an outpt.    PMHX and PSHX.  1.A FIB on Eliquis  2,CHF (? Diastolic per previous cardiology note)  3.HTN  4 Unclear CKD (creat 1.38 in 2017)  5.PPM  6.Hypothyroidism    MEDICATIONS  (STANDING):  apixaban 5 milliGRAM(s) Oral two times a day  atorvastatin 10 milliGRAM(s) Oral at bedtime  calcitonin Injectable 240 International Unit(s) SubCutaneous every 12 hours  clopidogrel Tablet 75 milliGRAM(s) Oral <User Schedule>  fenofibrate Tablet 48 milliGRAM(s) Oral daily  levothyroxine 88 MICROGram(s) Oral daily  metoprolol succinate  milliGRAM(s) Oral two times a day  potassium chloride    Tablet ER 40 milliEquivalent(s) Oral once  sodium chloride 0.9%. 1000 milliLiter(s) (100 mL/Hr) IV Continuous <Continuous>    MEDICATIONS  (PRN):  acetaminophen   Tablet .. 650 milliGRAM(s) Oral every 6 hours PRN Temp greater or equal to 38C (100.4F), Mild Pain (1 - 3)  docusate sodium 100 milliGRAM(s) Oral three times a day PRN Constipation  senna 2 Tablet(s) Oral at bedtime PRN Constipation    Vital Signs Last 24 Hrs  T(C): 36.3 (2019 11:29), Max: 36.9 (2019 20:51)  T(F): 97.3 (2019 11:29), Max: 98.5 (2019 20:51)  HR: 70 (2019 11:29) (70 - 74)  BP: 125/58 (2019 11:29) (125/58 - 138/63)  BP(mean): --  RR: 16 (2019 11:29) (16 - 21)  SpO2: 93% (2019 11:29) (90% - 93%)  Daily     Daily Weight in k (2019 07:40)      PHYSICAL EXAM:  Alert. confused  GENERAL: No distress  CHEST/LUNG: Clear to aus  HEART: S1S2 RRR  ABDOMEN: soft  EXTREMITIES: no edema  SKIN:     LABS:                        16.3   11.30 )-----------( 299      ( 2019 07:03 )             48.9         141  |  104  |  47<H>  ----------------------------<  125<H>  3.1<L>   |  30  |  1.43<H>    Ca    12.2<H>      2019 07:03  Phos  2.7       Mg     2.3         TPro  6.4  /  Alb  x   /  TBili  x   /  DBili  x   /  AST  x   /  ALT  x   /  AlkPhos  x   25    PT/INR - ( 2019 14:21 )   PT: 26.4 sec;   INR: 2.32 ratio         PTT - ( 2019 14:21 )  PTT:36.7 sec  Urinalysis Basic - ( 2019 15:10 )    Color: Yellow / Appearance: very cloudy / S.025 / pH: x  Gluc: x / Ketone: Negative  / Bili: Negative / Urobili: Negative mg/dL   Blood: x / Protein: 30 mg/dL / Nitrite: Negative   Leuk Esterase: Moderate / RBC: 0-2 /HPF / WBC 3-5   Sq Epi: x / Non Sq Epi: Many / Bacteria: TNTC      Magnesium, Serum: 2.3 mg/dL ( @ 07:03)  Phosphorus Level, Serum: 2.7 mg/dL ( @ 07:03)  Intact PTH: 7 pg/mL ( @ 19:46)  Vitamin D, 1, 25-Dihydroxy: 27.5 pg/mL ( @ 19:46)  Vitamin D, 25-Hydroxy: 65.7 ng/mL ( @ 19:46)          RADIOLOGY & ADDITIONAL TESTS:

## 2019-06-30 NOTE — PROGRESS NOTE ADULT - PROVIDER SPECIALTY LIST ADULT
Cardiology
Hospitalist
Nephrology
Hospitalist

## 2019-07-01 ENCOUNTER — TRANSCRIPTION ENCOUNTER (OUTPATIENT)
Age: 84
End: 2019-07-01

## 2019-07-01 VITALS — SYSTOLIC BLOOD PRESSURE: 111 MMHG | HEART RATE: 69 BPM | TEMPERATURE: 98 F | DIASTOLIC BLOOD PRESSURE: 61 MMHG

## 2019-07-01 LAB
ALBUMIN SERPL ELPH-MCNC: 2.9 G/DL — LOW (ref 3.3–5)
ANION GAP SERPL CALC-SCNC: 6 MMOL/L — SIGNIFICANT CHANGE UP (ref 5–17)
BUN SERPL-MCNC: 28 MG/DL — HIGH (ref 7–23)
CALCIUM SERPL-MCNC: 8.4 MG/DL — LOW (ref 8.5–10.1)
CHLORIDE SERPL-SCNC: 116 MMOL/L — HIGH (ref 96–108)
CO2 SERPL-SCNC: 26 MMOL/L — SIGNIFICANT CHANGE UP (ref 22–31)
CREAT SERPL-MCNC: 1.04 MG/DL — SIGNIFICANT CHANGE UP (ref 0.5–1.3)
CULTURE RESULTS: SIGNIFICANT CHANGE UP
CULTURE RESULTS: SIGNIFICANT CHANGE UP
GLUCOSE SERPL-MCNC: 99 MG/DL — SIGNIFICANT CHANGE UP (ref 70–99)
PHOSPHATE SERPL-MCNC: 2 MG/DL — LOW (ref 2.5–4.5)
POTASSIUM SERPL-MCNC: 4.2 MMOL/L — SIGNIFICANT CHANGE UP (ref 3.5–5.3)
POTASSIUM SERPL-SCNC: 4.2 MMOL/L — SIGNIFICANT CHANGE UP (ref 3.5–5.3)
SODIUM SERPL-SCNC: 148 MMOL/L — HIGH (ref 135–145)
SPECIMEN SOURCE: SIGNIFICANT CHANGE UP
SPECIMEN SOURCE: SIGNIFICANT CHANGE UP

## 2019-07-01 RX ORDER — FUROSEMIDE 40 MG
1 TABLET ORAL
Qty: 0 | Refills: 0 | DISCHARGE
Start: 2019-07-01

## 2019-07-01 RX ORDER — SPIRONOLACTONE 25 MG/1
0.5 TABLET, FILM COATED ORAL
Qty: 0 | Refills: 0 | DISCHARGE

## 2019-07-01 RX ORDER — FUROSEMIDE 40 MG
1 TABLET ORAL
Qty: 0 | Refills: 0 | DISCHARGE

## 2019-07-01 RX ORDER — ACETAMINOPHEN 500 MG
2 TABLET ORAL
Qty: 0 | Refills: 0 | DISCHARGE
Start: 2019-07-01

## 2019-07-01 RX ORDER — DOCUSATE SODIUM 100 MG
1 CAPSULE ORAL
Qty: 0 | Refills: 0 | DISCHARGE
Start: 2019-07-01

## 2019-07-01 RX ORDER — SENNA PLUS 8.6 MG/1
2 TABLET ORAL
Qty: 0 | Refills: 0 | DISCHARGE
Start: 2019-07-01

## 2019-07-01 RX ADMIN — APIXABAN 5 MILLIGRAM(S): 2.5 TABLET, FILM COATED ORAL at 04:45

## 2019-07-01 RX ADMIN — Medication 48 MILLIGRAM(S): at 11:51

## 2019-07-01 RX ADMIN — Medication 88 MICROGRAM(S): at 04:44

## 2019-07-01 RX ADMIN — Medication 100 MILLIGRAM(S): at 04:44

## 2019-07-01 RX ADMIN — Medication 81 MILLIGRAM(S): at 11:52

## 2019-07-01 NOTE — DISCHARGE NOTE PROVIDER - HOSPITAL COURSE
86 y.o. female with PMH AFib on Eliquis, CHF (unknown EF), HTN, HLD, CKD stage 3, hypothyroidism, osteoporosis presents with s/p unwitnessed fall at home. Pt and  with grandchildren at bedside providing history. Pt's  reports that pt was found on the floor after getting out of the restroom. Pt denies hitting head. She denies fever, chills, n/v, CP, SOB. Per grandchildren at bedside, pt more confused and lethargic than her usual self. Reports she normally conversant and last known normal self was 2 days ago when grandson spoke with her. Now, she is more confused than usual. Reports she would say things and babble. Pt organizes her own medications in pill box. Pt denies taking extra meds.        06/27/19: Patient seen and examined. Still with confusion as per daughter, but improving. Discussed with  and daughter at bed side regarding management and d/c plan.     06/28/19; Patient seen and examined. More awake and alert today. Mental status much improved. Discussed with  at bed side.     06/29/19; Patient seen and examined. Sitting in a chair, smiling. She denies any complaints. Discussed with daughter and  at bed side regarding management and d/c plan.    06/30/19: Patient seen and examined. She denies any complaints.      07/01/19: Patient seen and examined. No more confusion. Discussed with son and  at bed side regarding d/c plan.             Vital Signs Last 24 Hrs    T(C): 36.7 (01 Jul 2019 11:25), Max: 36.7 (01 Jul 2019 04:43)    T(F): 98.1 (01 Jul 2019 11:25), Max: 98.1 (01 Jul 2019 04:43)    HR: 69 (01 Jul 2019 11:25) (69 - 71)    BP: 111/61 (01 Jul 2019 11:25) (108/61 - 125/65)    BP(mean): --    RR: 17 (01 Jul 2019 04:43) (17 - 17)    SpO2: 98% (01 Jul 2019 04:43) (97% - 98%)                PHYSICAL EXAM:        Constitutional: NAD, awake and alert, well-developed    HEENT: PERR, EOMI, Normal Hearing, MMM    Neck: Soft and supple, No LAD, No JVD    Respiratory: Breath sounds are clear bilaterally, No wheezing, rales or rhonchi    Cardiovascular: S1 and S2, regular rate and rhythm, no Murmurs, gallops or rubs    Gastrointestinal: Bowel Sounds present, soft, nontender, nondistended, no guarding, no rebound    Extremities: No peripheral edema    Vascular: 2+ peripheral pulses    Neurological: A/O x 3, no focal deficits    Musculoskeletal: 5/5 strength b/l upper and lower extremities    Skin: No rashes                        #acute metabolic  encephalopathy due to hypercalcemia    Improving        #CARLOS on CKD stage 3 with hypercalcemia-resolved    #lethargy due to toxic encephalopathy likely from hypercalcemia: resolved    -S/P IV fluid    -SPEP: normal     -hold lasix, losartan, spironolactone, vit D, calcium for now, start slowly with lower dose    -renal consult and follow up appreciated         #elevated troponin likely demand ischemia with CARLOS        #s/p unwitnessed fall, reported right leg pain    -Needs rehab placement        #CHF-chr systolic EF 45%    -I/O, daily weight    -Start low dose lasix        # ?Hematuria Vs melena-    Resolved        #AFib on Eliquis        D/C to rehab    Spent more than 30 min to prepare the discharge.

## 2019-07-01 NOTE — DISCHARGE NOTE PROVIDER - NSDCCPCAREPLAN_GEN_ALL_CORE_FT
PRINCIPAL DISCHARGE DIAGNOSIS  Diagnosis: Dehydration  Assessment and Plan of Treatment: Follow up wtih Dr Nance      SECONDARY DISCHARGE DIAGNOSES  Diagnosis: CARLOS (acute kidney injury)  Assessment and Plan of Treatment:     Diagnosis: Elevated troponin  Assessment and Plan of Treatment:

## 2019-07-01 NOTE — DISCHARGE NOTE NURSING/CASE MANAGEMENT/SOCIAL WORK - NSDCDPATPORTLINK_GEN_ALL_CORE
You can access the [facility] Patient Portal, offered by [facility], by registering with the following website: [email or web address]

## 2019-07-01 NOTE — DISCHARGE NOTE PROVIDER - CARE PROVIDER_API CALL
Yun, SukHyeon (MD)  Nephrology  [address], [address]  [place] [address]  Phone: [phone]  Fax: [phone]  Follow Up Time:

## 2019-07-04 LAB
% GAMMA, URINE: 6.3 % — SIGNIFICANT CHANGE UP
ALBUMIN 24H MFR UR ELPH: 30.9 % — SIGNIFICANT CHANGE UP
ALPHA1 GLOB 24H MFR UR ELPH: 34.7 % — SIGNIFICANT CHANGE UP
ALPHA2 GLOB 24H MFR UR ELPH: 16.2 % — SIGNIFICANT CHANGE UP
B-GLOBULIN 24H MFR UR ELPH: 11.9 % — SIGNIFICANT CHANGE UP
INTERPRETATION 24H UR IFE-IMP: SIGNIFICANT CHANGE UP
INTERPRETATION 24H UR IFE-IMP: SIGNIFICANT CHANGE UP
M PROTEIN 24H UR ELPH-MRATE: SIGNIFICANT CHANGE UP
PROT ?TM UR-MCNC: 60 MG/DL — HIGH (ref 0–12)
PROT PATTERN 24H UR ELPH-IMP: SIGNIFICANT CHANGE UP
TOTAL VOLUME - 24 HOUR: SIGNIFICANT CHANGE UP ML
URINE CREATININE CALCULATION: SIGNIFICANT CHANGE UP G/24 H (ref 0.8–1.8)

## 2019-07-07 DIAGNOSIS — I13.0 HYPERTENSIVE HEART AND CHRONIC KIDNEY DISEASE WITH HEART FAILURE AND STAGE 1 THROUGH STAGE 4 CHRONIC KIDNEY DISEASE, OR UNSPECIFIED CHRONIC KIDNEY DISEASE: ICD-10-CM

## 2019-07-07 DIAGNOSIS — Z95.0 PRESENCE OF CARDIAC PACEMAKER: ICD-10-CM

## 2019-07-07 DIAGNOSIS — I50.32 CHRONIC DIASTOLIC (CONGESTIVE) HEART FAILURE: ICD-10-CM

## 2019-07-07 DIAGNOSIS — E78.5 HYPERLIPIDEMIA, UNSPECIFIED: ICD-10-CM

## 2019-07-07 DIAGNOSIS — G92 TOXIC ENCEPHALOPATHY: ICD-10-CM

## 2019-07-07 DIAGNOSIS — E03.9 HYPOTHYROIDISM, UNSPECIFIED: ICD-10-CM

## 2019-07-07 DIAGNOSIS — I48.91 UNSPECIFIED ATRIAL FIBRILLATION: ICD-10-CM

## 2019-07-07 DIAGNOSIS — N17.9 ACUTE KIDNEY FAILURE, UNSPECIFIED: ICD-10-CM

## 2019-07-07 DIAGNOSIS — I24.8 OTHER FORMS OF ACUTE ISCHEMIC HEART DISEASE: ICD-10-CM

## 2019-07-07 DIAGNOSIS — N18.3 CHRONIC KIDNEY DISEASE, STAGE 3 (MODERATE): ICD-10-CM

## 2019-07-07 DIAGNOSIS — E86.0 DEHYDRATION: ICD-10-CM

## 2019-07-07 DIAGNOSIS — E83.52 HYPERCALCEMIA: ICD-10-CM

## 2019-07-26 ENCOUNTER — APPOINTMENT (OUTPATIENT)
Dept: ELECTROPHYSIOLOGY | Facility: CLINIC | Age: 84
End: 2019-07-26

## 2019-09-27 ENCOUNTER — APPOINTMENT (OUTPATIENT)
Dept: CARDIOLOGY | Facility: CLINIC | Age: 84
End: 2019-09-27
Payer: MEDICARE

## 2019-09-27 ENCOUNTER — NON-APPOINTMENT (OUTPATIENT)
Age: 84
End: 2019-09-27

## 2019-09-27 VITALS
HEART RATE: 72 BPM | SYSTOLIC BLOOD PRESSURE: 104 MMHG | WEIGHT: 152 LBS | BODY MASS INDEX: 30.64 KG/M2 | RESPIRATION RATE: 16 BRPM | HEIGHT: 59 IN | DIASTOLIC BLOOD PRESSURE: 58 MMHG

## 2019-09-27 DIAGNOSIS — M81.0 AGE-RELATED OSTEOPOROSIS W/OUT CURRENT PATHOLOGICAL FRACTURE: ICD-10-CM

## 2019-09-27 DIAGNOSIS — Z86.79 PERSONAL HISTORY OF OTHER DISEASES OF THE CIRCULATORY SYSTEM: ICD-10-CM

## 2019-09-27 DIAGNOSIS — N18.3 CHRONIC KIDNEY DISEASE, STAGE 3 (MODERATE): ICD-10-CM
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
## 2019-09-27 DIAGNOSIS — Z72.3 LACK OF PHYSICAL EXERCISE: ICD-10-CM

## 2019-09-27 DIAGNOSIS — Z82.49 FAMILY HISTORY OF ISCHEMIC HEART DISEASE AND OTHER DISEASES OF THE CIRCULATORY SYSTEM: ICD-10-CM

## 2019-09-27 PROCEDURE — 99205 OFFICE O/P NEW HI 60 MIN: CPT

## 2019-09-27 PROCEDURE — 93000 ELECTROCARDIOGRAM COMPLETE: CPT

## 2019-09-27 RX ORDER — METOPROLOL SUCCINATE 100 MG/1
100 TABLET, EXTENDED RELEASE ORAL DAILY
Refills: 0 | Status: ACTIVE | COMMUNITY

## 2019-09-27 RX ORDER — CHOLECALCIFEROL (VITAMIN D3) 50 MCG
2000 CAPSULE ORAL
Refills: 0 | Status: DISCONTINUED | COMMUNITY
End: 2019-09-27

## 2019-09-27 RX ORDER — CALCIUM CARBONATE 600 MG
TABLET ORAL
Refills: 0 | Status: DISCONTINUED | COMMUNITY
End: 2019-09-27

## 2019-09-27 RX ORDER — DIGOXIN 0.06 MG/1
TABLET ORAL
Refills: 0 | Status: DISCONTINUED | COMMUNITY
End: 2019-09-27

## 2019-09-27 RX ORDER — AMLODIPINE BESYLATE 2.5 MG/1
2.5 TABLET ORAL DAILY
Refills: 0 | Status: DISCONTINUED | COMMUNITY
End: 2019-09-27

## 2019-09-27 NOTE — HISTORY OF PRESENT ILLNESS
[FreeTextEntry1] : Patient is an 85yo F with AF, PPM for SSS, CHF here for cardiac evaluation. Was in hospital in the spring for a fall and high calcium. Went to rehab after that ,went home several weeks back. Then went to assisted living at [facility]  Recently in [facility] for fatigue/cough, mild PAIGE and PNA treated with antibiotics. Now on home O2 after being discharged, but has been on O2 in past when has had PNA.  Sent home to assisted living and feeling a bit better now. [place] [facility] [place] closer to here now so changing cardiologist. States has had heart problems for many years, PPM placed several years back. Chronic CHF but no known CAD. Now with chronic EVANS at baseline. Gets winded after 50-60 feet. No CP. Patient denies PND/orthopnea/palpitations/syncope/claudication. LEg swelling better recently. \par \par ROS: GI negative, all others negative

## 2019-09-27 NOTE — PHYSICAL EXAM
[Normal Conjunctiva] : the conjunctiva exhibited no abnormalities [Normal Oropharynx] : normal oropharynx [Normal Jugular Venous V Waves Present] : normal jugular venous V waves present [Heart Sounds] : normal S1 and S2 [Heart Rate And Rhythm] : heart rate and rhythm were normal [Respiration, Rhythm And Depth] : normal respiratory rhythm and effort [] : no respiratory distress [Bowel Sounds] : normal bowel sounds [Abdomen Soft] : soft [Abdomen Tenderness] : non-tender [Nail Clubbing] : no clubbing of the fingernails [Cyanosis, Localized] : no localized cyanosis [Skin Turgor] : normal skin turgor [Skin Color & Pigmentation] : normal skin color and pigmentation [Oriented To Time, Place, And Person] : oriented to person, place, and time [Affect] : the affect was normal [FreeTextEntry1] : uses walker

## 2019-09-27 NOTE — ASSESSMENT
[FreeTextEntry1] : ECG: SR, PRWP, LAFB, NSST\par \par ECHO 9/2019 ([facility])\par 1. Mild LVH, EF 60-65%\par 2. Normal RV\par 3. Severe LAE/YOU\par 4. Moderate MR\par 5. Moderate to severe TR, RVSP 58mmHg\par 6. Moderate PI

## 2019-09-27 NOTE — DISCUSSION/SUMMARY
[FreeTextEntry1] : Patient is an 87yo F with AF, PPM for SSS, chronic HFpEF, recurrent PNA now on home O2 here for cardiac evaluation.CHF compensated at this time with chronic edema that maybe multifactorial. Likely worsened by need for IVF for PNA/sepsis. Will continue current diuretic dosing and repeat BMP/Mg. Patient also on DAPT and A/C, ? indication for DAPT as patient denies history PCI but will obtain prior records. \par \par 1. Continue current diuretic dose, check renal function and renal follow up. May need to adjust diuretic at follow up.\par 2. Continue anticoagulation for atrial fibrillation thromboembolic prophylaxis \par 3. Continue current antihypertensives, blood pressure is well controlled \par 4. Increase physical activity as tolerated \par 5. pulm follow up for O2\par 6. Follow up 2 months and check PPM at that time.

## 2019-11-12 ENCOUNTER — APPOINTMENT (OUTPATIENT)
Dept: CARDIOLOGY | Facility: CLINIC | Age: 84
End: 2019-11-12
Payer: MEDICARE

## 2019-11-12 PROCEDURE — 93288 INTERROG EVL PM/LDLS PM IP: CPT

## 2019-11-25 ENCOUNTER — NON-APPOINTMENT (OUTPATIENT)
Age: 84
End: 2019-11-25

## 2019-11-25 ENCOUNTER — APPOINTMENT (OUTPATIENT)
Dept: CARDIOLOGY | Facility: CLINIC | Age: 84
End: 2019-11-25
Payer: MEDICARE

## 2019-11-25 VITALS
SYSTOLIC BLOOD PRESSURE: 127 MMHG | HEIGHT: 59 IN | BODY MASS INDEX: 29.03 KG/M2 | DIASTOLIC BLOOD PRESSURE: 70 MMHG | RESPIRATION RATE: 16 BRPM | HEART RATE: 70 BPM | WEIGHT: 144 LBS

## 2019-11-25 DIAGNOSIS — R06.02 SHORTNESS OF BREATH: ICD-10-CM

## 2019-11-25 PROCEDURE — 93000 ELECTROCARDIOGRAM COMPLETE: CPT

## 2019-11-25 PROCEDURE — 99214 OFFICE O/P EST MOD 30 MIN: CPT

## 2019-11-25 RX ORDER — CLOPIDOGREL BISULFATE 75 MG/1
75 TABLET, FILM COATED ORAL DAILY
Refills: 0 | Status: DISCONTINUED | COMMUNITY
End: 2019-11-25

## 2019-11-25 NOTE — PHYSICAL EXAM
[Normal Conjunctiva] : the conjunctiva exhibited no abnormalities [Normal Oropharynx] : normal oropharynx [Normal Jugular Venous V Waves Present] : normal jugular venous V waves present [] : no respiratory distress [Respiration, Rhythm And Depth] : normal respiratory rhythm and effort [Heart Rate And Rhythm] : heart rate and rhythm were normal [Heart Sounds] : normal S1 and S2 [Bowel Sounds] : normal bowel sounds [Abdomen Tenderness] : non-tender [Abdomen Soft] : soft [Nail Clubbing] : no clubbing of the fingernails [Cyanosis, Localized] : no localized cyanosis [Skin Turgor] : normal skin turgor [Skin Color & Pigmentation] : normal skin color and pigmentation [Affect] : the affect was normal [Oriented To Time, Place, And Person] : oriented to person, place, and time [FreeTextEntry1] : 2+ pitting edema bilaterally, left greater than right

## 2019-11-25 NOTE — DISCUSSION/SUMMARY
[FreeTextEntry1] : Patient is an 85yo F with AF, PPM for SSS, chronic HFpEF, recurrent PNA now on home O2 here for cardiac follow up. Was hospitalized after last visit for acute diastolic CHF, ? related to medication non-adherence. Diuretics had been cut back from hospitalization prior to that as well. Was on course antibiotics after her recent hospitalization now finished. Weight is now down and back on aldactone, appears euvolemic. Will continue\par \par 1. Continue diuretic, patient is euvolemic \par 2. Continue anticoagulation for atrial fibrillation thromboembolic prophylaxis , continue lower dose eliquis due to renal function and age. Will recheck creatinine prior to follow up\par 3. Continue current antihypertensives, blood pressure is well controlled \par 4. Increase physical activity as tolerated \par 5. Follow up 2 months with echo to evaluate HFpEF and MR/PAP\par 6. DC plavix, ? indication. Will continue ASA\par 7. Pulm and PMD follow up\par 8. MOnitor daily weights, 2gm sodium diet as well.

## 2019-11-25 NOTE — ASSESSMENT
[FreeTextEntry1] : ECG: SR, PRWP, leftward axis, IVCD, intermittent demand v-pacing\par \par PPM INTERROGATION 11/2019:\par 1. 19% a-paced, 30% v-paced\par 2. 41 mode switches for PAF, longest duration for 29hours 9/18/19\par 3. Normal function \par \par ECHO 9/2019 ([facility])\par 1. Mild LVH, EF 60-65%\par 2. Normal RV\par 3. Severe LAE/YOU\par 4. Moderate MR\par 5. Moderate to severe TR, RVSP 58mmHg\par 6. Moderate PI\par \par CATH 2012: Normal coronary arteries

## 2019-11-25 NOTE — HISTORY OF PRESENT ILLNESS
[FreeTextEntry1] : Patient is an 85yo F with AF, PPM for SSS, CHF here for cardiac evaluation. Was in hospital in the spring for a fall and high calcium. Went to rehab after that ,went home several weeks back. Then went to assisted living at [facility]  Recently in [facility] for fatigue/cough, mild PAIGE and PNA treated with antibiotics. Now on home O2 after being discharged, but has been on O2 in past when has had PNA.  Sent home to assisted living and feeling a bit better now. Was seen at [facility] [place] Akins but living closer to here now so changing cardiologist. States has had heart problems for many years, PPM placed several years back. Chronic CHF but no known CAD.\par \par Hospitalized after last visit for acute HFpEF, ? related to medication non-adherence. Back on lasix/aldactone now. Back in [facility] assisted living. AFter visit, her legs started to swell up and SOB and went to hospital. \par \par ROS: GI and  negative

## 2020-01-07 ENCOUNTER — APPOINTMENT (OUTPATIENT)
Dept: CARDIOLOGY | Facility: CLINIC | Age: 85
End: 2020-01-07
Payer: MEDICARE

## 2020-01-07 PROCEDURE — 93306 TTE W/DOPPLER COMPLETE: CPT

## 2020-01-24 ENCOUNTER — APPOINTMENT (OUTPATIENT)
Dept: CARDIOLOGY | Facility: CLINIC | Age: 85
End: 2020-01-24
Payer: MEDICARE

## 2020-01-24 VITALS
BODY MASS INDEX: 28.22 KG/M2 | RESPIRATION RATE: 16 BRPM | HEIGHT: 59 IN | OXYGEN SATURATION: 91 % | DIASTOLIC BLOOD PRESSURE: 80 MMHG | HEART RATE: 67 BPM | SYSTOLIC BLOOD PRESSURE: 129 MMHG | WEIGHT: 140 LBS

## 2020-01-24 PROCEDURE — 99214 OFFICE O/P EST MOD 30 MIN: CPT

## 2020-01-24 RX ORDER — LOSARTAN POTASSIUM 100 MG/1
100 TABLET, FILM COATED ORAL DAILY
Qty: 90 | Refills: 1 | Status: DISCONTINUED | COMMUNITY
End: 2020-01-24

## 2020-01-24 NOTE — PHYSICAL EXAM
[Normal Conjunctiva] : the conjunctiva exhibited no abnormalities [Normal Jugular Venous V Waves Present] : normal jugular venous V waves present [Normal Oropharynx] : normal oropharynx [Respiration, Rhythm And Depth] : normal respiratory rhythm and effort [] : no respiratory distress [Heart Sounds] : normal S1 and S2 [Heart Rate And Rhythm] : heart rate and rhythm were normal [Abdomen Soft] : soft [Bowel Sounds] : normal bowel sounds [Abdomen Tenderness] : non-tender [Nail Clubbing] : no clubbing of the fingernails [Cyanosis, Localized] : no localized cyanosis [Skin Color & Pigmentation] : normal skin color and pigmentation [Affect] : the affect was normal [Oriented To Time, Place, And Person] : oriented to person, place, and time [Skin Turgor] : normal skin turgor [FreeTextEntry1] : 2+ pitting edema bilaterally, left greater than right

## 2020-01-24 NOTE — ASSESSMENT
[FreeTextEntry1] : ECG: SR, PRWP, leftward axis, IVCD, intermittent demand v-pacing (done prior visit)\par \par PPM INTERROGATION 11/2019:\par 1. 19% a-paced, 30% v-paced\par 2. 41 mode switches for PAF, longest duration for 29hours 9/18/19\par 3. Normal function \par \par ECHO 1/2020:\par 1. Mild LVH, EF 70-75%\par 2. Grade II diastolic dysfunction\par 3. Normal RV\par 4. Severe LAE\par 5. Mild to moderate MR and TR\par 6. Mild PHTN, RVSP 47mmHg\par \par ECHO 9/2019 ([facility])\par 1. Mild LVH, EF 60-65%\par 2. Normal RV\par 3. Severe LAE/YOU\par 4. Moderate MR\par 5. Moderate to severe TR, RVSP 58mmHg\par 6. Moderate PI\par \par CATH 2012: Normal coronary arteries

## 2020-01-24 NOTE — HISTORY OF PRESENT ILLNESS
[FreeTextEntry1] : Patient is an 85yo F with AF, PPM for SSS, CHF here for cardiac evaluation. Was in hospital in last spring for a fall and high calcium. Went to rehab after that ,went home several weeks back. Then went to assisted living at [facility]  Recently in [facility] for fatigue/cough, mild PAIGE and PNA treated with antibiotics. Now on home O2 after being discharged, but has been on O2 in past when has had PNA.  Sent home to assisted living and feeling a bit better now. [place] [facility] [place] closer to here now so changing cardiologist. States has had heart problems for many years, PPM placed several years back. Chronic CHF but no known CAD.\par \par Hospitalized again in the fall 2019 for acute HFpEF, ? related to medication non-adherence. Back on lasix/aldactone now. Back in [facility] assisted living. AFter visit, her legs started to swell up and SOB and went to hospital. \par \par Since last visit has had BW showing stable renal function and lytes. REcently developed cough with sputum past week or so. On antibiotics now. No CP or change in SOB. no edema\par \par ROS: GI and  negative

## 2020-01-24 NOTE — DISCUSSION/SUMMARY
[FreeTextEntry1] : Patient is an 87yo F with AF, PPM for SSS, chronic HFpEF, recurrent PNA now on home O2 here for cardiac follow up. Recent echo with findings consistent with long standing HTN and chronic HFpEF, there is mild LVH with grade II diastolic dysfunction and mild PHTN likely all post-capillary. Mild to moderate MR not significant this time, continue surveillance. Currently with bronchitis and on inhaler/antibiotics. No signs decompensated CHF however. \par \par 1. Continue diuretic, patient is euvolemic \par 2. Continue anticoagulation for atrial fibrillation thromboembolic prophylaxis , continue lower dose eliquis due to renal function and age. \par 3. Continue current antihypertensives, blood pressure is well controlled \par 4. Increase physical activity as tolerated \par 5. Follow up 3 months with BW including BMp/Mg/BNP then\par 6. Will continue ASA\par 7. Pulm and PMD follow up\par 8. MOnitor daily weights, 2gm sodium diet as well. 1.5 Liter fluid restriction\par

## 2020-04-21 ENCOUNTER — APPOINTMENT (OUTPATIENT)
Dept: CARDIOLOGY | Facility: CLINIC | Age: 85
End: 2020-04-21

## 2020-04-29 NOTE — PHYSICAL THERAPY INITIAL EVALUATION ADULT - BALANCE DISTURBANCE, SYSTEM IMPAIRMENT CONTRIBUTE, REHAB EVAL
Patient canceled f/u apt for 4/8/20.  Liana Reza to screen to offer telemed and document
musculoskeletal

## 2020-05-12 ENCOUNTER — APPOINTMENT (OUTPATIENT)
Dept: CARDIOLOGY | Facility: CLINIC | Age: 85
End: 2020-05-12

## 2020-07-15 ENCOUNTER — APPOINTMENT (OUTPATIENT)
Dept: SURGERY | Facility: CLINIC | Age: 85
End: 2020-07-15

## 2020-08-26 NOTE — ASU PREOP CHECKLIST - INTERNAL PROSTHESES
----- Message from Lizbet Dai sent at 8/26/2020  3:35 PM EDT -----  Rajesh Munguia has been rescheduled.  Yariel Cortez     ----- Message -----  From: Nilda Gallagher APRN  Sent: 8/26/2020   1:34 PM EDT  To: SAMUEL Torres, #      Pt was a no show for appointment today. Please reschedule. She is scheduled to see Dr. Toro in September. Please keep that appt. Thanks.     
yes(specify)/pacemaker

## 2020-11-23 ENCOUNTER — APPOINTMENT (OUTPATIENT)
Dept: CARDIOLOGY | Facility: CLINIC | Age: 85
End: 2020-11-23
Payer: MEDICARE

## 2020-11-23 DIAGNOSIS — R94.39 ABNORMAL RESULT OF OTHER CARDIOVASCULAR FUNCTION STUDY: ICD-10-CM

## 2020-11-23 DIAGNOSIS — R07.9 CHEST PAIN, UNSPECIFIED: ICD-10-CM

## 2020-11-23 DIAGNOSIS — R53.83 OTHER FATIGUE: ICD-10-CM

## 2020-11-23 PROCEDURE — 99442: CPT

## 2020-11-23 RX ORDER — PRAVASTATIN SODIUM 20 MG/1
20 TABLET ORAL
Refills: 0 | Status: DISCONTINUED | COMMUNITY
End: 2020-11-23

## 2021-02-15 ENCOUNTER — APPOINTMENT (OUTPATIENT)
Dept: CARDIOLOGY | Facility: CLINIC | Age: 86
End: 2021-02-15
Payer: MEDICARE

## 2021-02-15 ENCOUNTER — NON-APPOINTMENT (OUTPATIENT)
Age: 86
End: 2021-02-15

## 2021-02-15 VITALS
WEIGHT: 140 LBS | RESPIRATION RATE: 16 BRPM | BODY MASS INDEX: 28.22 KG/M2 | DIASTOLIC BLOOD PRESSURE: 82 MMHG | SYSTOLIC BLOOD PRESSURE: 142 MMHG | HEIGHT: 59 IN | HEART RATE: 70 BPM | TEMPERATURE: 97.2 F

## 2021-02-15 DIAGNOSIS — M10.9 GOUT, UNSPECIFIED: ICD-10-CM

## 2021-02-15 DIAGNOSIS — I73.00 RAYNAUD'S SYNDROME W/OUT GANGRENE: ICD-10-CM

## 2021-02-15 PROCEDURE — 99214 OFFICE O/P EST MOD 30 MIN: CPT

## 2021-02-15 PROCEDURE — 93000 ELECTROCARDIOGRAM COMPLETE: CPT | Mod: 59

## 2021-02-15 PROCEDURE — 93288 INTERROG EVL PM/LDLS PM IP: CPT

## 2021-02-15 RX ORDER — APIXABAN 5 MG/1
5 TABLET, FILM COATED ORAL
Qty: 30 | Refills: 0 | Status: COMPLETED | COMMUNITY
Start: 2020-11-04

## 2021-02-15 RX ORDER — IBANDRONATE SODIUM 150 MG/1
150 TABLET, FILM COATED ORAL
Refills: 0 | Status: DISCONTINUED | COMMUNITY
End: 2021-02-15

## 2021-02-15 RX ORDER — ALLOPURINOL 100 MG/1
100 TABLET ORAL DAILY
Qty: 180 | Refills: 0 | Status: ACTIVE | COMMUNITY
Start: 2020-11-11

## 2021-02-15 RX ORDER — AMOXICILLIN AND CLAVULANATE POTASSIUM 500; 125 MG/1; 1/1
TABLET, FILM COATED ORAL
Refills: 0 | Status: DISCONTINUED | COMMUNITY
End: 2021-02-15

## 2021-02-15 RX ORDER — POTASSIUM CHLORIDE 750 MG/1
10 CAPSULE, EXTENDED RELEASE ORAL
Qty: 60 | Refills: 0 | Status: COMPLETED | COMMUNITY
Start: 2020-11-26

## 2021-02-15 RX ORDER — ASPIRIN ENTERIC COATED TABLETS 81 MG 81 MG/1
81 TABLET, DELAYED RELEASE ORAL
Qty: 30 | Refills: 0 | Status: COMPLETED | COMMUNITY
Start: 2020-10-12

## 2021-02-15 RX ORDER — IPRATROPIUM BROMIDE 18 MCG
AEROSOL WITH ADAPTER (GRAM) INHALATION
Refills: 0 | Status: DISCONTINUED | COMMUNITY
End: 2021-02-15

## 2021-02-15 RX ORDER — MENTHOL 10%, METHYL SALICYLATE 15% 10; 15 G/100G; G/100G
10-15 CREAM TOPICAL
Qty: 85 | Refills: 0 | Status: ACTIVE | COMMUNITY
Start: 2020-08-10

## 2021-02-15 RX ORDER — PREDNISONE 5 MG/1
5 TABLET ORAL
Qty: 15 | Refills: 0 | Status: COMPLETED | COMMUNITY
Start: 2020-10-10

## 2021-02-15 NOTE — PHYSICAL EXAM
[Normal Conjunctiva] : the conjunctiva exhibited no abnormalities [Normal Jugular Venous V Waves Present] : normal jugular venous V waves present [] : no respiratory distress [Respiration, Rhythm And Depth] : normal respiratory rhythm and effort [Heart Rate And Rhythm] : heart rate and rhythm were normal [Heart Sounds] : normal S1 and S2 [Bowel Sounds] : normal bowel sounds [Abdomen Soft] : soft [Abdomen Tenderness] : non-tender [Cyanosis, Localized] : no localized cyanosis [Nail Clubbing] : no clubbing of the fingernails [Skin Color & Pigmentation] : normal skin color and pigmentation [Skin Turgor] : normal skin turgor [Oriented To Time, Place, And Person] : oriented to person, place, and time [Affect] : the affect was normal [FreeTextEntry1] : 1+ pitting edema at ankles

## 2021-02-15 NOTE — DISCUSSION/SUMMARY
[FreeTextEntry1] : Patient is an 86yo F with AF, PPM for SSS, chronic HFpEF, recurrent PNA now on home O2 here for cardiac follow up. \par -CHF appears well compensated, mild edema only\par -BP adequately controlled\par -ECG unchanged. PPM functioning. Rare PAF episodes, no symptoms\par -Mild symptoms of raynauds, no indication for further pharm therapy at this time\par \par 1. Continue diuretic, patient is euvolemic , BW with primary\par 2. Continue anticoagulation for atrial fibrillation thromboembolic prophylaxis , continue lower dose eliquis due to renal function and age. \par 3. Continue current antihypertensives, blood pressure is well enough controlled \par 4. Increase physical activity as tolerated \par 5. Keep legs elevated when sitting as much as possible\par 6. Follow up 6 months, echo to eval HFpEF , PPM check then as well

## 2021-02-15 NOTE — ASSESSMENT
[FreeTextEntry1] : ECG: Atrial paced, IVCD,  leftward axis\par \par PPM INTERROGATION 2/2021: Normal function, 52% AP, 52% , 20 mode switches PAF longest 31 seconds\par \par PPM INTERROGATION 11/2019:\par 1. 19% a-paced, 30% v-paced\par 2. 41 mode switches for PAF, longest duration for 29hours 9/18/19\par 3. Normal function \par \par ECHO 1/2020:\par 1. Mild LVH, EF 70-75%\par 2. Grade II diastolic dysfunction\par 3. Normal RV\par 4. Severe LAE\par 5. Mild to moderate MR and TR\par 6. Mild PHTN, RVSP 47mmHg\par \par ECHO 9/2019 ([facility])\par 1. Mild LVH, EF 60-65%\par 2. Normal RV\par 3. Severe LAE/YOU\par 4. Moderate MR\par 5. Moderate to severe TR, RVSP 58mmHg\par 6. Moderate PI\par \par CATH 2012: Normal coronary arteries

## 2021-02-15 NOTE — HISTORY OF PRESENT ILLNESS
[FreeTextEntry1] : Patient is an 88yo F with AF, PPM for SSS, CHF here for cardiac evaluation. . States has had heart problems for many years, PPM placed several years back. Chronic CHF but no known CAD.STill feels SOB/fatigue. No edema, denies PND/orthopnea. Will have intermittent CP lasts seconds and resolves. \par \par \par \par ROS: GI and  negative

## 2021-06-22 ENCOUNTER — APPOINTMENT (OUTPATIENT)
Dept: CARDIOLOGY | Facility: CLINIC | Age: 86
End: 2021-06-22
Payer: MEDICARE

## 2021-06-22 VITALS — DIASTOLIC BLOOD PRESSURE: 64 MMHG | SYSTOLIC BLOOD PRESSURE: 122 MMHG

## 2021-06-22 PROCEDURE — 93306 TTE W/DOPPLER COMPLETE: CPT

## 2021-08-10 ENCOUNTER — APPOINTMENT (OUTPATIENT)
Dept: CARDIOLOGY | Facility: CLINIC | Age: 86
End: 2021-08-10
Payer: MEDICARE

## 2021-08-10 ENCOUNTER — NON-APPOINTMENT (OUTPATIENT)
Age: 86
End: 2021-08-10

## 2021-08-10 VITALS
BODY MASS INDEX: 36.29 KG/M2 | RESPIRATION RATE: 16 BRPM | SYSTOLIC BLOOD PRESSURE: 116 MMHG | HEART RATE: 67 BPM | DIASTOLIC BLOOD PRESSURE: 74 MMHG | HEIGHT: 59 IN | WEIGHT: 180 LBS

## 2021-08-10 DIAGNOSIS — I48.0 PAROXYSMAL ATRIAL FIBRILLATION: ICD-10-CM

## 2021-08-10 DIAGNOSIS — I50.32 CHRONIC DIASTOLIC (CONGESTIVE) HEART FAILURE: ICD-10-CM

## 2021-08-10 DIAGNOSIS — I10 ESSENTIAL (PRIMARY) HYPERTENSION: ICD-10-CM

## 2021-08-10 DIAGNOSIS — E78.5 HYPERLIPIDEMIA, UNSPECIFIED: ICD-10-CM

## 2021-08-10 DIAGNOSIS — I49.5 SICK SINUS SYNDROME: ICD-10-CM

## 2021-08-10 DIAGNOSIS — I34.0 NONRHEUMATIC MITRAL (VALVE) INSUFFICIENCY: ICD-10-CM

## 2021-08-10 DIAGNOSIS — Z95.0 PRESENCE OF CARDIAC PACEMAKER: ICD-10-CM

## 2021-08-10 PROCEDURE — 93000 ELECTROCARDIOGRAM COMPLETE: CPT | Mod: 59

## 2021-08-10 PROCEDURE — 93288 INTERROG EVL PM/LDLS PM IP: CPT

## 2021-08-10 PROCEDURE — 99214 OFFICE O/P EST MOD 30 MIN: CPT

## 2021-08-10 RX ORDER — APIXABAN 5 MG/1
5 TABLET, FILM COATED ORAL
Qty: 60 | Refills: 0 | Status: ACTIVE | COMMUNITY

## 2021-08-10 RX ORDER — LEVOTHYROXINE SODIUM 50 UG/1
50 TABLET ORAL DAILY
Refills: 0 | Status: DISCONTINUED | COMMUNITY
End: 2021-08-10

## 2021-08-10 RX ORDER — IBANDRONATE SODIUM 150 MG/1
150 TABLET, FILM COATED ORAL
Refills: 0 | Status: ACTIVE | COMMUNITY

## 2021-08-10 RX ORDER — ASPIRIN 81 MG
81 TABLET, DELAYED RELEASE (ENTERIC COATED) ORAL DAILY
Refills: 3 | Status: ACTIVE | COMMUNITY

## 2021-08-10 RX ORDER — FENOFIBRATE 48 MG/1
48 TABLET ORAL DAILY
Refills: 0 | Status: ACTIVE | COMMUNITY

## 2021-08-10 RX ORDER — IBANDRONATE SODIUM 150 MG/1
150 TABLET, FILM COATED ORAL
Refills: 0 | Status: DISCONTINUED | COMMUNITY
End: 2021-08-10

## 2021-08-10 RX ORDER — ATORVASTATIN CALCIUM 40 MG/1
40 TABLET, FILM COATED ORAL
Qty: 90 | Refills: 1 | Status: ACTIVE | COMMUNITY

## 2021-08-10 RX ORDER — FUROSEMIDE 40 MG/1
40 TABLET ORAL
Refills: 0 | Status: DISCONTINUED | COMMUNITY
End: 2021-08-10

## 2021-08-10 RX ORDER — APIXABAN 5 MG/1
5 TABLET, FILM COATED ORAL
Qty: 60 | Refills: 5 | Status: DISCONTINUED | COMMUNITY
End: 2021-08-10

## 2021-08-10 RX ORDER — ATORVASTATIN CALCIUM 40 MG/1
40 TABLET, FILM COATED ORAL
Qty: 30 | Refills: 5 | Status: DISCONTINUED | OUTPATIENT
Start: 2020-11-23 | End: 2021-08-10

## 2021-08-10 RX ORDER — FENOFIBRATE 48 MG/1
48 TABLET ORAL DAILY
Refills: 0 | Status: DISCONTINUED | COMMUNITY
End: 2021-08-10

## 2021-08-10 RX ORDER — AMLODIPINE BESYLATE 5 MG/1
5 TABLET ORAL DAILY
Qty: 90 | Refills: 1 | Status: DISCONTINUED | COMMUNITY
Start: 2020-11-20 | End: 2021-08-10

## 2021-08-10 RX ORDER — LEVOTHYROXINE SODIUM 0.05 MG/1
50 TABLET ORAL DAILY
Qty: 90 | Refills: 0 | Status: ACTIVE | COMMUNITY

## 2021-08-10 RX ORDER — AMLODIPINE BESYLATE 5 MG/1
5 TABLET ORAL DAILY
Qty: 90 | Refills: 1 | Status: ACTIVE | COMMUNITY

## 2021-08-10 RX ORDER — FUROSEMIDE 40 MG/1
40 TABLET ORAL
Qty: 45 | Refills: 1 | Status: ACTIVE | COMMUNITY

## 2021-08-10 RX ORDER — ACETAMINOPHEN 325 MG/1
325 TABLET ORAL
Refills: 0 | Status: ACTIVE | COMMUNITY
Start: 2019-11-23

## 2021-08-10 RX ORDER — ASPIRIN 81 MG
81 TABLET, DELAYED RELEASE (ENTERIC COATED) ORAL DAILY
Refills: 3 | Status: DISCONTINUED | COMMUNITY
End: 2021-08-10

## 2021-08-10 NOTE — HISTORY OF PRESENT ILLNESS
[FreeTextEntry1] : Patient is an 88yo F with AF, PPM for SSS, CHF here for cardiac evaluation. . States has had heart problems for many years, PPM placed several years back. Chronic CHF but no known CAD. Feels tired but no CP/SOB. Very sedentary and mostly in wheelchair. USes walker at times. Patient denies PND/orthopnea/palpitations/syncope/claudication . Remains swollen but thinks its better, weight up as well. \par \par \par \par ROS: GI and  negative

## 2021-08-10 NOTE — DISCUSSION/SUMMARY
[FreeTextEntry1] : Patient is an 89yo F with AF, PPM for SSS, chronic HFpEF, recurrent PNA now on home O2 here for cardiac follow up. \par -CHF appears well compensated, mild edema only\par -Echo 6/2021 unchanged, normal BiV function and grade II diastolic dysfunction. MR not severe \par -BP adequately controlled\par -ECG unchanged. PPM functioning. Rare PAF episodes, no symptoms\par -Mild symptoms of raynauds, no indication for further pharm therapy at this time\par -PPM with normal function, more episodes PAF but no clear symptoms. No change in management \par \par 1. Continue diuretic, patient is euvolemic , BW with primary\par 2. Continue anticoagulation for atrial fibrillation thromboembolic prophylaxis , continue lower dose eliquis due to renal function and age. \par 3. Continue current antihypertensives, blood pressure is well enough controlled \par 4. Increase physical activity as tolerated \par 5. Keep legs elevated when sitting as much as possible\par 6. Follow up 6 months, PPM check then as well \par 7. Check CMP, Mg and BNP. Call with results

## 2021-08-10 NOTE — PHYSICAL EXAM
[Normal Conjunctiva] : the conjunctiva exhibited no abnormalities [Normal Jugular Venous V Waves Present] : normal jugular venous V waves present [] : no respiratory distress [Respiration, Rhythm And Depth] : normal respiratory rhythm and effort [Heart Rate And Rhythm] : heart rate and rhythm were normal [Heart Sounds] : normal S1 and S2 [Bowel Sounds] : normal bowel sounds [Abdomen Soft] : soft [Abdomen Tenderness] : non-tender [Nail Clubbing] : no clubbing of the fingernails [Cyanosis, Localized] : no localized cyanosis [Skin Color & Pigmentation] : normal skin color and pigmentation [Skin Turgor] : normal skin turgor [Oriented To Time, Place, And Person] : oriented to person, place, and time [Affect] : the affect was normal [FreeTextEntry1] : 1-2+ pitting edema at ankles

## 2021-08-10 NOTE — ASSESSMENT
[FreeTextEntry1] : ECG: SR, significant artifact, IVCD, NSST\par \par PPM INTERROGATION 8/2021: Normal function, 39% AP, 49% , 39 mode switches for PAF, longest 42.5hours\par PPM INTERROGATION 2/2021: Normal function, 52% AP, 52% , 20 mode switches PAF longest 31 seconds\par \par PPM INTERROGATION 11/2019:\par 1. 19% a-paced, 30% v-paced\par 2. 41 mode switches for PAF, longest duration for 29hours 9/18/19\par 3. Normal function \par \par ECHO 6/2021:\par 1. Mild LVH, EF 70-75%\par 2. Grade II diastolic dysfunction\par 3. Severe LAE\par 4. Normal RV/RA\par 5. Mild to moderate MR, jet NWV\par 6, Mild TR, RVSP 38mmHg\par 7. SMall plaque in ascending aorta\par 8. NO significant change from prior \par \par ECHO 1/2020:\par 1. Mild LVH, EF 70-75%\par 2. Grade II diastolic dysfunction\par 3. Normal RV\par 4. Severe LAE\par 5. Mild to moderate MR and TR\par 6. Mild PHTN, RVSP 47mmHg\par \par ECHO 9/2019 ([facility])\par 1. Mild LVH, EF 60-65%\par 2. Normal RV\par 3. Severe LAE/YOU\par 4. Moderate MR\par 5. Moderate to severe TR, RVSP 58mmHg\par 6. Moderate PI\par \par CATH 2012: Normal coronary arteries

## 2021-11-30 ENCOUNTER — NON-APPOINTMENT (OUTPATIENT)
Age: 86
End: 2021-11-30

## 2021-12-03 ENCOUNTER — NON-APPOINTMENT (OUTPATIENT)
Age: 86
End: 2021-12-03

## 2021-12-03 RX ORDER — POTASSIUM CHLORIDE 750 MG/1
10 TABLET, FILM COATED, EXTENDED RELEASE ORAL TWICE DAILY
Refills: 0 | Status: DISCONTINUED | COMMUNITY
End: 2021-12-03

## 2021-12-03 RX ORDER — SPIRONOLACTONE 25 MG/1
25 TABLET, FILM COATED ORAL DAILY
Qty: 90 | Refills: 1 | Status: DISCONTINUED | COMMUNITY
End: 2021-12-03

## 2022-01-25 ENCOUNTER — APPOINTMENT (OUTPATIENT)
Dept: CARDIOLOGY | Facility: CLINIC | Age: 87
End: 2022-01-25

## 2023-04-05 NOTE — ED ADULT NURSE NOTE - CHIEF COMPLAINT
Good morning,     Echo order placed by Dr. Sharpe is \"Transthoracic Echo (TTE) Complete\", this causes issues in [facility].    Please place order as \"Transthoracic Echo (TTE) Complete W/ W/O Imaging Agent\".    Patient has appt on 4/7 at 9:00am.   The patient is a 84y Female complaining of palpitations.

## 2023-04-13 NOTE — ED ADULT NURSE NOTE - CAS EDP DISCH DISPOSITION ADMI
Spoke to pt in regards to scheduling scope. Pt states he will have his wife call us to schedule for him. Call back # provided to pt   
Alert & oriented; no sensory, motor or coordination deficits, normal reflexes
Telemetry/3E